# Patient Record
Sex: FEMALE | Race: WHITE | NOT HISPANIC OR LATINO | Employment: FULL TIME | URBAN - METROPOLITAN AREA
[De-identification: names, ages, dates, MRNs, and addresses within clinical notes are randomized per-mention and may not be internally consistent; named-entity substitution may affect disease eponyms.]

---

## 2020-01-15 ENCOUNTER — HOSPITAL ENCOUNTER (INPATIENT)
Facility: HOSPITAL | Age: 60
LOS: 3 days | Discharge: HOME | End: 2020-01-18
Attending: SPECIALIST | Admitting: SPECIALIST

## 2020-01-15 ENCOUNTER — HOSPITAL ENCOUNTER (EMERGENCY)
Facility: HOSPITAL | Age: 60
Discharge: PSYCHIATRIC OR SUBSTANCE USE FACILITY | End: 2020-01-15
Attending: EMERGENCY MEDICINE | Admitting: EMERGENCY MEDICINE

## 2020-01-15 VITALS
BODY MASS INDEX: 22.9 KG/M2 | HEIGHT: 70 IN | OXYGEN SATURATION: 94 % | SYSTOLIC BLOOD PRESSURE: 111 MMHG | HEART RATE: 59 BPM | DIASTOLIC BLOOD PRESSURE: 70 MMHG | RESPIRATION RATE: 16 BRPM | WEIGHT: 160 LBS | TEMPERATURE: 98.4 F

## 2020-01-15 DIAGNOSIS — F33.1 MODERATE EPISODE OF RECURRENT MAJOR DEPRESSIVE DISORDER (HCC): Primary | ICD-10-CM

## 2020-01-15 DIAGNOSIS — R45.851 SUICIDAL IDEATION: ICD-10-CM

## 2020-01-15 DIAGNOSIS — IMO0001 ALCOHOLISM /ALCOHOL ABUSE: ICD-10-CM

## 2020-01-15 DIAGNOSIS — F33.2 SEVERE RECURRENT MAJOR DEPRESSION WITHOUT PSYCHOTIC FEATURES (HCC): Primary | ICD-10-CM

## 2020-01-15 LAB
ALBUMIN SERPL-MCNC: 4.2 G/DL (ref 3.5–5.2)
ALBUMIN/GLOB SERPL: 1.8 G/DL
ALP SERPL-CCNC: 63 U/L (ref 39–117)
ALT SERPL W P-5'-P-CCNC: 13 U/L (ref 1–33)
AMPHET+METHAMPHET UR QL: NEGATIVE
ANION GAP SERPL CALCULATED.3IONS-SCNC: 11.7 MMOL/L (ref 5–15)
AST SERPL-CCNC: 15 U/L (ref 1–32)
BACTERIA UR QL AUTO: NORMAL /HPF
BARBITURATES UR QL SCN: NEGATIVE
BASOPHILS # BLD AUTO: 0.02 10*3/MM3 (ref 0–0.2)
BASOPHILS NFR BLD AUTO: 0.3 % (ref 0–1.5)
BENZODIAZ UR QL SCN: NEGATIVE
BILIRUB SERPL-MCNC: 0.4 MG/DL (ref 0.2–1.2)
BILIRUB UR QL STRIP: NEGATIVE
BUN BLD-MCNC: 7 MG/DL (ref 8–23)
BUN/CREAT SERPL: 10.6 (ref 7–25)
CALCIUM SPEC-SCNC: 9.3 MG/DL (ref 8.6–10.5)
CANNABINOIDS SERPL QL: NEGATIVE
CHLORIDE SERPL-SCNC: 105 MMOL/L (ref 98–107)
CLARITY UR: CLEAR
CO2 SERPL-SCNC: 25.3 MMOL/L (ref 22–29)
COCAINE UR QL: NEGATIVE
COLOR UR: YELLOW
CREAT BLD-MCNC: 0.66 MG/DL (ref 0.57–1)
DEPRECATED RDW RBC AUTO: 40.9 FL (ref 37–54)
EOSINOPHIL # BLD AUTO: 0.04 10*3/MM3 (ref 0–0.4)
EOSINOPHIL NFR BLD AUTO: 0.5 % (ref 0.3–6.2)
ERYTHROCYTE [DISTWIDTH] IN BLOOD BY AUTOMATED COUNT: 11.8 % (ref 12.3–15.4)
ETHANOL BLD-MCNC: <10 MG/DL (ref 0–10)
ETHANOL UR QL: <0.01 %
GFR SERPL CREATININE-BSD FRML MDRD: 91 ML/MIN/1.73
GLOBULIN UR ELPH-MCNC: 2.3 GM/DL
GLUCOSE BLD-MCNC: 110 MG/DL (ref 65–99)
GLUCOSE UR STRIP-MCNC: NEGATIVE MG/DL
HCT VFR BLD AUTO: 38.2 % (ref 34–46.6)
HGB BLD-MCNC: 13.1 G/DL (ref 12–15.9)
HGB UR QL STRIP.AUTO: ABNORMAL
HYALINE CASTS UR QL AUTO: NORMAL /LPF
IMM GRANULOCYTES # BLD AUTO: 0.04 10*3/MM3 (ref 0–0.05)
IMM GRANULOCYTES NFR BLD AUTO: 0.5 % (ref 0–0.5)
KETONES UR QL STRIP: NEGATIVE
LEUKOCYTE ESTERASE UR QL STRIP.AUTO: NEGATIVE
LYMPHOCYTES # BLD AUTO: 1.68 10*3/MM3 (ref 0.7–3.1)
LYMPHOCYTES NFR BLD AUTO: 22 % (ref 19.6–45.3)
MCH RBC QN AUTO: 32 PG (ref 26.6–33)
MCHC RBC AUTO-ENTMCNC: 34.3 G/DL (ref 31.5–35.7)
MCV RBC AUTO: 93.2 FL (ref 79–97)
METHADONE UR QL SCN: NEGATIVE
MONOCYTES # BLD AUTO: 0.49 10*3/MM3 (ref 0.1–0.9)
MONOCYTES NFR BLD AUTO: 6.4 % (ref 5–12)
NEUTROPHILS # BLD AUTO: 5.36 10*3/MM3 (ref 1.7–7)
NEUTROPHILS NFR BLD AUTO: 70.3 % (ref 42.7–76)
NITRITE UR QL STRIP: NEGATIVE
NRBC BLD AUTO-RTO: 0 /100 WBC (ref 0–0.2)
OPIATES UR QL: NEGATIVE
OXYCODONE UR QL SCN: NEGATIVE
PH UR STRIP.AUTO: 6 [PH] (ref 5–8)
PLATELET # BLD AUTO: 233 10*3/MM3 (ref 140–450)
PMV BLD AUTO: 10.5 FL (ref 6–12)
POTASSIUM BLD-SCNC: 4.2 MMOL/L (ref 3.5–5.2)
PROT SERPL-MCNC: 6.5 G/DL (ref 6–8.5)
PROT UR QL STRIP: NEGATIVE
RBC # BLD AUTO: 4.1 10*6/MM3 (ref 3.77–5.28)
RBC # UR: NORMAL /HPF
REF LAB TEST METHOD: NORMAL
SODIUM BLD-SCNC: 142 MMOL/L (ref 136–145)
SP GR UR STRIP: 1.01 (ref 1–1.03)
SQUAMOUS #/AREA URNS HPF: NORMAL /HPF
UROBILINOGEN UR QL STRIP: ABNORMAL
WBC NRBC COR # BLD: 7.63 10*3/MM3 (ref 3.4–10.8)
WBC UR QL AUTO: NORMAL /HPF

## 2020-01-15 PROCEDURE — 80307 DRUG TEST PRSMV CHEM ANLYZR: CPT | Performed by: EMERGENCY MEDICINE

## 2020-01-15 PROCEDURE — 80053 COMPREHEN METABOLIC PANEL: CPT | Performed by: EMERGENCY MEDICINE

## 2020-01-15 PROCEDURE — 85025 COMPLETE CBC W/AUTO DIFF WBC: CPT | Performed by: EMERGENCY MEDICINE

## 2020-01-15 PROCEDURE — 99285 EMERGENCY DEPT VISIT HI MDM: CPT

## 2020-01-15 PROCEDURE — 90791 PSYCH DIAGNOSTIC EVALUATION: CPT

## 2020-01-15 PROCEDURE — 81001 URINALYSIS AUTO W/SCOPE: CPT | Performed by: EMERGENCY MEDICINE

## 2020-01-15 RX ORDER — LOPERAMIDE HYDROCHLORIDE 2 MG/1
2 CAPSULE ORAL
Status: DISCONTINUED | OUTPATIENT
Start: 2020-01-15 | End: 2020-01-18 | Stop reason: HOSPADM

## 2020-01-15 RX ORDER — LISINOPRIL 5 MG/1
5 TABLET ORAL DAILY
COMMUNITY
End: 2020-01-18 | Stop reason: HOSPADM

## 2020-01-15 RX ORDER — LORAZEPAM 1 MG/1
1 TABLET ORAL ONCE
Status: COMPLETED | OUTPATIENT
Start: 2020-01-15 | End: 2020-01-15

## 2020-01-15 RX ORDER — ALUMINA, MAGNESIA, AND SIMETHICONE 2400; 2400; 240 MG/30ML; MG/30ML; MG/30ML
15 SUSPENSION ORAL EVERY 6 HOURS PRN
Status: DISCONTINUED | OUTPATIENT
Start: 2020-01-15 | End: 2020-01-18 | Stop reason: HOSPADM

## 2020-01-15 RX ORDER — TRAZODONE HYDROCHLORIDE 50 MG/1
50 TABLET ORAL NIGHTLY
COMMUNITY

## 2020-01-15 RX ORDER — ACETAMINOPHEN 325 MG/1
650 TABLET ORAL EVERY 4 HOURS PRN
Status: DISCONTINUED | OUTPATIENT
Start: 2020-01-15 | End: 2020-01-18 | Stop reason: HOSPADM

## 2020-01-15 RX ADMIN — LORAZEPAM 1 MG: 1 TABLET ORAL at 20:45

## 2020-01-15 NOTE — ED NOTES
Sitter at bedside and clothes removed from patient. Family at bedside.      Shanon Ramirez, RN  01/15/20 7771

## 2020-01-15 NOTE — ED NOTES
Pt's family approached RN and stated that pt has a boyfriend that might show up to visit her out of concern for her and that the pt does not want to be visited by this person (Mahendra Sales). Pt's family was told that if she is made a private encounter, no visitors will be allowed back after that point, family verbalized understanding and no changes are made to the chart. No safety concerns from the boyfriend at this time.      Shanon Ramirez, RN  01/15/20 1442
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
## 2020-01-15 NOTE — ED TRIAGE NOTES
"Patient c/o depression intermittently for 3 months. She denies SI/HI. She reports having had a seizure Tuesday am and went to [facility] and was admitted overnight. She had a MRI during the admission and her sister reported it showed \"an old stroke\". The patient reports feeling like she is having a nervous breakdown.   "
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
## 2020-01-16 PROBLEM — R77.8 ELEVATED TROPONIN: Status: ACTIVE | Noted: 2020-01-16

## 2020-01-16 PROBLEM — F41.9 ANXIETY DISORDER: Status: ACTIVE | Noted: 2020-01-16

## 2020-01-16 PROBLEM — I10 HTN (HYPERTENSION): Status: ACTIVE | Noted: 2020-01-16

## 2020-01-16 PROBLEM — IMO0001 ALCOHOLISM /ALCOHOL ABUSE: Status: ACTIVE | Noted: 2020-01-16

## 2020-01-16 PROBLEM — R56.9 SEIZURES (HCC): Status: ACTIVE | Noted: 2020-01-16

## 2020-01-16 PROBLEM — R79.89 ELEVATED TROPONIN: Status: ACTIVE | Noted: 2020-01-16

## 2020-01-16 LAB
TROPONIN T SERPL-MCNC: <0.01 NG/ML (ref 0–0.03)
TROPONIN T SERPL-MCNC: <0.01 NG/ML (ref 0–0.03)

## 2020-01-16 PROCEDURE — 93005 ELECTROCARDIOGRAM TRACING: CPT | Performed by: NURSE PRACTITIONER

## 2020-01-16 PROCEDURE — 93010 ELECTROCARDIOGRAM REPORT: CPT | Performed by: INTERNAL MEDICINE

## 2020-01-16 PROCEDURE — 84484 ASSAY OF TROPONIN QUANT: CPT | Performed by: NURSE PRACTITIONER

## 2020-01-16 RX ORDER — TRAZODONE HYDROCHLORIDE 50 MG/1
50 TABLET ORAL NIGHTLY
Status: DISCONTINUED | OUTPATIENT
Start: 2020-01-16 | End: 2020-01-18 | Stop reason: HOSPADM

## 2020-01-16 RX ORDER — LORAZEPAM 1 MG/1
2 TABLET ORAL
Status: DISCONTINUED | OUTPATIENT
Start: 2020-01-16 | End: 2020-01-18 | Stop reason: HOSPADM

## 2020-01-16 RX ORDER — LISINOPRIL 5 MG/1
5 TABLET ORAL DAILY
Status: DISCONTINUED | OUTPATIENT
Start: 2020-01-16 | End: 2020-01-18 | Stop reason: HOSPADM

## 2020-01-16 RX ORDER — DESVENLAFAXINE SUCCINATE 50 MG/1
50 TABLET, EXTENDED RELEASE ORAL DAILY
Status: DISCONTINUED | OUTPATIENT
Start: 2020-01-16 | End: 2020-01-18 | Stop reason: HOSPADM

## 2020-01-16 RX ADMIN — DESVENLAFAXINE SUCCINATE 50 MG: 50 TABLET, EXTENDED RELEASE ORAL at 15:17

## 2020-01-16 RX ADMIN — TRAZODONE HYDROCHLORIDE 50 MG: 50 TABLET ORAL at 20:52

## 2020-01-16 RX ADMIN — LISINOPRIL 5 MG: 5 TABLET ORAL at 15:17

## 2020-01-16 NOTE — PLAN OF CARE
Problem: Patient Care Overview  Goal: Discharge Needs Assessment  Outcome: Ongoing (interventions implemented as appropriate)  Flowsheets  Taken 1/16/2020 1156  Concerns Comments: Pt will return home.  Pt will receive an KALYAN consult  1:1 session.  SW will explore outpt providers for continuity of care.  Taken 1/16/2020 1153  Transportation Anticipated: family or friend will provide  Transportation Concerns: car, none  Concerns to be Addressed: coping/stress;decision making;mental health;discharge planning;relationship;substance/tobacco abuse/use;suicidal  Patient/Family Anticipated Services at Transition: mental health services  Patient/Family Anticipates Transition to: home with family
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
## 2020-01-16 NOTE — PLAN OF CARE
Problem: Patient Care Overview  Goal: Individualization and Mutuality  Outcome: Ongoing (interventions implemented as appropriate)  Flowsheets (Taken 1/16/2020 0958)  Patient Personal Strengths: stable living environment; family/social support     Problem: Patient Care Overview  Goal: Interprofessional Rounds/Family Conf  Outcome: Ongoing (interventions implemented as appropriate)  Flowsheets (Taken 1/16/2020 0958)  Participants: art therapy; ; pastoral care; pharmacy; psychiatrist; nursing; social work  Summary: Treatment team met to discuss pt's plan of care.  Pt will receive an KALYAN consult & 1:1 session.  SW will explore outpt providers.  Progress & svcs needed will be assessed ongoing.     Problem: Mood Impairment (Depressive Signs/Symptoms) (Adult)  Goal: Improved Mood Symptoms (Depressive Signs/Symptoms)  Outcome: Ongoing (interventions implemented as appropriate)  Flowsheets (Taken 1/16/2020 0958)  Improved Mood Symptoms Time Frame for Action Step (STG): 4 days  Improved Mood Symptoms Action Step (STG) Outcome: making progress toward outcome     Problem: Feelings of Worthlessness, Hopelessness, Excessive Guilt (Depressive Signs/Symptoms) (Adult)  Goal: Enhanced Self-Esteem/Confidence (Depressive Signs/Symptoms)  Outcome: Ongoing (interventions implemented as appropriate)  Flowsheets (Taken 1/16/2020 0958)  Enhanced Self-Esteem/Confidence Time Frame for Action Step (STG): 4 days  Enhanced Self-Esteem/Confidence Action Step (STG) Outcome: making progress toward outcome     Problem: Decreased Participation/Engagement (Depressive Signs/Symptoms) (Adult)  Goal: Increased Participation/Engagement (Depressive Signs/Symptoms)  Outcome: Ongoing (interventions implemented as appropriate)  Flowsheets (Taken 1/16/2020 0958)  Increased Participation/Engagement Time Frame for Action Step (STG): 4 days  Increased Participation/Engagement Action Step (STG) Outcome: making progress toward outcome     Problem:  Sleep Impairment (Depressive Signs/Symptoms) (Adult)  Goal: Improved Sleep Hygiene (Depressive Signs/Symptoms)  Outcome: Ongoing (interventions implemented as appropriate)  Flowsheets (Taken 1/16/2020 0958)  Improved Sleep Hygiene Time Frame for Action Step (STG): 4 days  Improved Sleep Hygiene Action Step (STG) Outcome: making progress toward outcome     Problem: Weight/Appetite Change (Depressive Signs/Symptoms) (Adult)  Goal: Nutrition/Weight Optimized (Depressive Signs/Symptoms)  Outcome: Ongoing (interventions implemented as appropriate)  Flowsheets (Taken 1/16/2020 0958)  Optimized Nutrition/Weight Time Frame for Action Step (STG): 4 days  Optimized Nutrition/Weight Action Step (STG) Outcome: making progress toward outcome     Problem: Social/Occupational/Functional Impairment (Depressive Signs/Symptoms) (Adult)  Goal: Improved Social/Occupational/Functional Skills (Depressive Signs/Symptoms)  Outcome: Ongoing (interventions implemented as appropriate)  Flowsheets (Taken 1/16/2020 0958)  Improved Social/Occupational/Functional Skills Time Frame for Action Step (STG): 4 days  Improved Social/Occupational/Functional Skills Action Step (STG) Outcome: making progress toward outcome     Problem: Cognitive Impairment (Depressive Signs/Symptoms) (Adult)  Goal: Improved Ability to Think/Concentrate (Depressive Signs/Symptoms)  Outcome: Ongoing (interventions implemented as appropriate)  Flowsheets (Taken 1/16/2020 0958)  Improved Ability to Think/Concentrate Time Frame for Action Step (STG): 4 days  Improved Ability to Think/Concentrate Action Step (STG) Outcome: making progress toward outcome    Patient/Guardian Signature: __________________________________            Psychiatrist Signature: ______________________________________             Therapist Signature: ________________________________________         Nurse Signature: ___________________________________________          Staff Signature:  ____________________________________________            Staff Signature: ____________________________________________          Staff Signature: ____________________________________________          Staff Signature:
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
## 2020-01-16 NOTE — NURSING NOTE
Pt was brought onto unit accompanied by security and staff. Pt was in wheel chair and d/t being medicated in ER pt required assistance into the bed. Skin assessment was completed by this RN and another. Brusing to Left wrist/hand noted. Unable to complete full assessment d/t pt asleep from anxiety medication given in the ER. Will continue to provide feedback and support.

## 2020-01-16 NOTE — CONSULTS
"Reviewed chart and interviewed pt: Pt disclosed that her drinking has caused her problems. \"I want to stop drinking and be able to love and support my 3 week old grand child instead of worrying about when I am going to be able to have a drink.\" \"I missed out on so much of my child's life because I stayed home and drank and made excuses why I was not there at family events\"   Educated pt on KALYAN/Co-occurring disorders. Educated pt ETOH consumption will cause psychotropic medications to be less effective.   Motivational interviewing to help pt see how much damage in her life ETOH has caused and how much better her life could be. Pt wants to stop drinking. Educated pt in [facility] KALYAN programming and invited her to come to treatment program. Strongly recommend pt have KALYAN treatment if not at [facility] some where. Gave pt all area resources for K[facility]AN IOP and all the recovery fellowships.  Pt interested in coming to [facility] IOP at [facility]. Gave her written information about it as well. Discussed the importance of starting treatment as soon as d/c as risk of relapse stronger the more time some one waits to start.   "

## 2020-01-16 NOTE — CONSULTS
Patient Name:  Maria Del Carmen Ricks  YOB: 1960  MRN:  [id number]  Date of Admission:  1/15/2020  Date of Consult:  1/16/2020  Patient Care Team:  Eisenmenger, James Robert, MD as PCP - General (Family Medicine)    Consults  Date of Admit: 1/15/2020  Date of Consult: 1/16/2020    Medical evaluation contributing to current psychiatric illness.    Subjective     History of Present Illness  Ms. Ricks is a 59 y.o. female with a history of alcohol abuse, anxiety, depression, HTN and seizures admitted to the Crisis Management Unit for further evaluation regarding depression, anxiety and suicidal ideation.  We were asked to see and evaluate her medical issues as they may pertain to her current psychiatric illness. She was recently seen at [facility] for transient alteration of awareness and community acquired pneumonia. Neurology workup negative. When she got home, apparently her mood worsened and she began having suicidal ideations and decided to come to [facility] ED. She currently denies any chest pain, shortness of breath, nausea or vomiting.    Past Medical History:   Diagnosis Date   • Alcohol abuse    • Anxiety    • Depression    • Hypertension    • Seizures (CMS/HCC)      History reviewed. No pertinent surgical history.  History reviewed. No pertinent family history.  Social History     Tobacco Use   • Smoking status: Never Smoker   • Smokeless tobacco: Never Used   Substance Use Topics   • Alcohol use: Yes     Alcohol/week: 3.0 - 4.0 standard drinks     Types: 3 - 4 Cans of beer per week     Comment: Daily with more on weekends   • Drug use: Never     Medications Prior to Admission   Medication Sig Dispense Refill Last Dose   • lisinopril (PRINIVIL,ZESTRIL) 5 MG tablet Take 5 mg by mouth Daily.   1/15/2020 at Unknown time   • Multiple Vitamins-Minerals (MULTIVITAMIN ADULTS PO) Take 1 tablet by mouth Daily.   1/14/2020 at Unknown   • sertraline (ZOLOFT) 50 MG tablet Take 50 mg by mouth Daily.    1/15/2020 at Unknown time   • traZODone (DESYREL) 50 MG tablet Take 50 mg by mouth Every Night.   1/15/2020 at Unknown time     Allergies:  Patient has no known allergies.    Review of Systems   Constitutional: Negative for chills and fever.   HENT: Negative for congestion and dental problem.    Eyes: Negative for discharge and itching.   Respiratory: Negative for cough, chest tightness and shortness of breath.    Cardiovascular: Negative for chest pain, palpitations and leg swelling.   Gastrointestinal: Negative for nausea and vomiting.   Endocrine: Negative for cold intolerance and heat intolerance.   Genitourinary: Negative for difficulty urinating and dysuria.   Musculoskeletal: Negative for back pain and gait problem.   Skin: Negative for color change and pallor.   Allergic/Immunologic: Negative for environmental allergies and food allergies.   Neurological: Negative for dizziness and headaches.   Hematological: Negative for adenopathy. Does not bruise/bleed easily.   Psychiatric/Behavioral: Negative for confusion. The patient is not nervous/anxious.        Objective      Vital Signs  Temp:  [97.6 °F (36.4 °C)-98.4 °F (36.9 °C)] 97.6 °F (36.4 °C)  Heart Rate:  [58-72] 72  Resp:  [14-16] 14  BP: (106-153)/(69-92) 140/79  There is no height or weight on file to calculate BMI.    Physical Exam   Constitutional: She is oriented to person, place, and time. She appears well-developed and well-nourished. No distress.   Eyes: Conjunctivae and EOM are normal.   Neck: Normal range of motion. Neck supple.   Cardiovascular: Normal rate and regular rhythm.   Abdominal: Soft. Bowel sounds are normal. She exhibits no distension. There is no tenderness.   Musculoskeletal: Normal range of motion. She exhibits no edema.   Neurological: She is alert and oriented to person, place, and time.   Skin: Skin is warm and dry.   Psychiatric: She has a normal mood and affect. Her behavior is normal.   Nursing note and vitals  reviewed.      Results Review:    I reviewed the patient's new clinical results.    Assessment/Plan     Active Hospital Problems    Diagnosis  POA   • **Severe recurrent major depression without psychotic features (CMS/HCC) [F33.2]  Yes   • HTN (hypertension) [I10]  Yes   • Seizures (CMS/HCC) [R56.9]  Yes   • Alcoholism /alcohol abuse (CMS/HCC) [F10.20]  Yes   • Anxiety disorder [F41.9]  Yes   • Elevated troponin [R79.89]  Yes      Resolved Hospital Problems   No resolved problems to display.       Assessment & Plan    HTN  - stable, continue home meds    Seizures  - stable, continue home meds    Elevated troponin  - serial troponin  - EKG NSR  - patient denies c/o chest pain, palpitations, SOA. I have instructed the patient should she develop any symptoms to have the nurse contact us immediately.      I will check peripherally for troponin to result. If trending down, will sign off and be available as needed. Thank you for allowing [facility] to be involved in this patient's care.      KENDALL Henderson  [facility]  01/16/20  2:44 PM

## 2020-01-16 NOTE — PROGRESS NOTES
Clinical Pharmacy Services: Medication History    Maria Del Carmen Ricks is a 59 y.o. female presenting to [facility] for Severe recurrent major depression without psychotic features (CMS/HCC) [F33.2]    She  has a past medical history of Alcohol abuse, Anxiety, Depression, Hypertension, and Seizures (CMS/HCC).    Allergies as of 01/15/2020    (No Known Allergies)       Medication information was obtained from: patient, medical record, and pharmacy  Pharmacy and Phone Number: [facility] - [address] - [phone]    Prior to Admission Medications       Prescriptions Last Dose Informant Patient Reported? Taking?    lisinopril (PRINIVIL,ZESTRIL) 5 MG tablet 1/15/2020 Pharmacy Yes Yes    Take 5 mg by mouth Daily.    Multiple Vitamins-Minerals (MULTIVITAMIN ADULTS PO) 1/14/2020 Self Yes Yes    Take 1 tablet by mouth Daily.    sertraline (ZOLOFT) 50 MG tablet 1/15/2020 Pharmacy Yes Yes    Take 50 mg by mouth Daily.    traZODone (DESYREL) 50 MG tablet 1/15/2020 Self Yes Yes    Take 50 mg by mouth Every Night.                Medication notes:  Medications verified with pharmacy and patient:  Trazodone 50mg qhs (patient taking as needed)  Zoloft 50mg daily  Lisinopril 5mg daily    Over-the-counter meds:  Women's One-a-day Multivitamin    Medication history for depressive disorder verified with [place] medical record:  Escitalopram 10mg daily    This medication list is complete to the best of my knowledge as of 1/16/2020    Please call if questions.    Jesse Gomez, PharmD Candidate  1/16/2020 9:10 AM

## 2020-01-16 NOTE — PROGRESS NOTES
Continued Stay Note  [facility]     Patient Name: Maria Del Carmen Ricks  MRN: [id number]  Today's Date: 1/16/2020    Admit Date: 1/15/2020    Discharge Plan     Row Name 01/16/20 1153       Plan    Plan  Pt will return home.  Pt will receive an KALYAN consult  1:1 session.  SW will explore outpt providers for continuity of care.    Plan Comments  SW met w/pt to discuss tx & d/c planning.  Pt was able to verify demographic info as well as PCP, Dr. Eisenmenger.  Pt stated that she receives her psychotropic medications from her PCP.  Pt stated that she does not have any mental health providers at this time and is unsure of she wants to see someone.  When asked about a work note; pt stated that she will need one upon d/c.  SW discussed the meds-to-beds program w/pt.        Discharge Codes    No documentation.             VENKATESH Valadez

## 2020-01-17 RX ADMIN — TRAZODONE HYDROCHLORIDE 50 MG: 50 TABLET ORAL at 20:38

## 2020-01-17 RX ADMIN — LISINOPRIL 5 MG: 5 TABLET ORAL at 08:33

## 2020-01-17 RX ADMIN — DESVENLAFAXINE SUCCINATE 50 MG: 50 TABLET, EXTENDED RELEASE ORAL at 08:33
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
## 2020-01-17 NOTE — H&P
The patient's H&P was dictated by this physician on 1/16/2020 at 0959 hrs.  It was mistakenly identified as a progress note.

## 2020-01-17 NOTE — PROGRESS NOTES
Continued Stay Note  [facility]     Patient Name: Maria Del Carmen Ricks  MRN: [id number]  Today's Date: 1/17/2020    Admit Date: 1/15/2020    Discharge Plan     Row Name 01/17/20 8967       Plan    Plan Comments  SW met w/pt to discuss follow-up care.  Pt was given an [facility] IOP flyer w/date and time to begin.  Pt was adv that [facility] has been set up for her from 1/20 to 1/23.  Pt was also given a survey.    Row Name 01/17/20 0071       Plan    Plan Comments  SW met w/pt to discuss follow-up care as pt is planning to d/c on Saturday.  SW discussed pt attend  [facility] IOP; pt stated that she could not drive due to seizure protocol.  SW adv that if she wanted to attend, transportation could be set up through [facility]; pt agreed to this plan and stated that she would be staying at her sister's home, [address], [address].  SW discussed IOP program and what it entails.  Pt stated that she would need paperwork completed for work; SW adv that the IOP staff could complete it for her.          Discharge Codes    No documentation.             VENKATESH Valadez

## 2020-01-17 NOTE — PROGRESS NOTES
Continued Stay Note  [facility]     Patient Name: Maria Del Carmen Ricks  MRN: [id number]  Today's Date: 1/17/2020    Admit Date: 1/15/2020    Discharge Plan     Row Name 01/17/20 1536       Plan    Plan Comments  SW met w/pt to discuss follow-up care as pt is planning to d/c on Saturday.  SW discussed pt attend  [facility] IOP; pt stated that she could not drive due to seizure protocol.  SW adv that if she wanted to attend, transportation could be set up through [facility]; pt agreed to this plan and stated that she would be staying at her sister's home, [address], [address].  SW discussed IOP program and what it entails.  Pt stated that she would need paperwork completed for work; SW adv that the IOP staff could complete it for her.          Discharge Codes    No documentation.             VENKATESH Valadez

## 2020-01-17 NOTE — PROGRESS NOTES
Chart reviewed, labs stable. Medicine will sign off but will be available should you need us.    Mi Schafer, APRN  1/17/2020 0873

## 2020-01-17 NOTE — PLAN OF CARE
Problem: Patient Care Overview  Goal: Plan of Care Review  Outcome: Ongoing (interventions implemented as appropriate)  Flowsheets (Taken 1/16/2020 1800)  Progress: no change  Plan of Care Reviewed With: patient  Patient Agreement with Plan of Care: agrees  Outcome Summary: Patient attended groups. She was tearful off and on this shift. Patient talked about getting out of a relationship that she is not happy in and how she has seen a decline in herself since being with this person. No SI at this time. Coping skills discussed with patient.  Goal: Individualization and Mutuality  Outcome: Ongoing (interventions implemented as appropriate)  Goal: Discharge Needs Assessment  Outcome: Ongoing (interventions implemented as appropriate)  Goal: Interprofessional Rounds/Family Conf  Outcome: Ongoing (interventions implemented as appropriate)     Problem: Overarching Goals (Adult)  Goal: Adheres to Safety Considerations for Self and Others  Outcome: Ongoing (interventions implemented as appropriate)  Goal: Optimized Coping Skills in Response to Life Stressors  Outcome: Ongoing (interventions implemented as appropriate)  Goal: Develops/Participates in Therapeutic [place] to Support Successful Transition  Outcome: Ongoing (interventions implemented as appropriate)     Problem: Anxiety (Adult)  Goal: Identify Related Risk Factors and Signs and Symptoms  Description  Related risk factors and signs and symptoms are identified upon initiation of Human Response Clinical Practice Guideline (CPG).  Outcome: Ongoing (interventions implemented as appropriate)  Goal: Reduction/Resolution  Description  Patient will demonstrate the desired outcomes by discharge/transition of care.  Outcome: Ongoing (interventions implemented as appropriate)     Problem: Mood Impairment (Depressive Signs/Symptoms) (Adult)  Goal: Improved Mood Symptoms (Depressive Signs/Symptoms)  Outcome: Ongoing (interventions implemented as appropriate)     Problem:  Feelings of Worthlessness, Hopelessness, Excessive Guilt (Depressive Signs/Symptoms) (Adult)  Goal: Enhanced Self-Esteem/Confidence (Depressive Signs/Symptoms)  Outcome: Ongoing (interventions implemented as appropriate)     Problem: Decreased Participation/Engagement (Depressive Signs/Symptoms) (Adult)  Goal: Increased Participation/Engagement (Depressive Signs/Symptoms)  Outcome: Ongoing (interventions implemented as appropriate)     Problem: Sleep Impairment (Depressive Signs/Symptoms) (Adult)  Goal: Improved Sleep Hygiene (Depressive Signs/Symptoms)  Outcome: Ongoing (interventions implemented as appropriate)     Problem: Weight/Appetite Change (Depressive Signs/Symptoms) (Adult)  Goal: Nutrition/Weight Optimized (Depressive Signs/Symptoms)  Outcome: Ongoing (interventions implemented as appropriate)     Problem: Social/Occupational/Functional Impairment (Depressive Signs/Symptoms) (Adult)  Goal: Improved Social/Occupational/Functional Skills (Depressive Signs/Symptoms)  Outcome: Ongoing (interventions implemented as appropriate)     Problem: Cognitive Impairment (Depressive Signs/Symptoms) (Adult)  Goal: Improved Ability to Think/Concentrate (Depressive Signs/Symptoms)  Outcome: Ongoing (interventions implemented as appropriate)     Problem: Suicide Risk (Adult)  Goal: Identify Related Risk Factors and Signs and Symptoms  Description  Related risk factors and signs and symptoms are identified upon initiation of Human Response Clinical Practice Guideline (CPG).  Outcome: Ongoing (interventions implemented as appropriate)  Goal: Strength-Based Wellness/Recovery  Description  Patient will demonstrate the desired outcomes by discharge/transition of care.  Outcome: Ongoing (interventions implemented as appropriate)  Goal: Physical Safety  Description  Patient will demonstrate the desired outcomes by discharge/transition of care.  Outcome: Ongoing (interventions implemented as appropriate)

## 2020-01-17 NOTE — PLAN OF CARE
Problem: Patient Care Overview  Goal: Plan of Care Review  Outcome: Ongoing (interventions implemented as appropriate)  Flowsheets  Taken 1/17/2020 1554  Progress: improving  Outcome Summary: Compliant with medications, She voiced no SI/HI this morning. Pt is pleasant and cooperative with tx. Attending groups. Alert and oriented x3. Will continue to monitor pt. and note any changes.  Taken 1/17/2020 0904  Plan of Care Reviewed With: patient  Patient Agreement with Plan of Care: agrees     Problem: Anxiety (Adult)  Goal: Identify Related Risk Factors and Signs and Symptoms  Outcome: Ongoing (interventions implemented as appropriate)  Flowsheets (Taken 1/17/2020 8221)  Related Risk Factors (Anxiety): coping ineffective; substance use/abuse  Signs and Symptoms (Anxiety): apprehension/being worried; dependence increased     Problem: Anxiety (Adult)  Goal: Reduction/Resolution  Outcome: Ongoing (interventions implemented as appropriate)  Flowsheets (Taken 1/17/2020 8040 by Ana Laura Escobar, RN)  Reduction/Resolution: making progress toward outcome

## 2020-01-17 NOTE — SIGNIFICANT NOTE
Talked with family on the phone to discuss discharge information in preparation for pt to possibly be discharged tomorrow. Talked with pt's sister Mercedes ([phone]) who stated the plan is for pt to live with sister Mercedes and her  once pt is discharged. Mercedes also shared understanding that pt is to follow-up in the [facility] IOP at [facility] starting Monday, January 20, 2020. Mercedes shared concern since pt had been tearful with her on the phone about need to make changes in relationship and also pt shared how had been happy with the treatment at [facility] and feels very safe to be discharged. Mercedes shared plan is to have pt's daughter and mother visit [facility] and discuss who would be able to drive pt to Mercedes's home tomorrow. This conversation was discussed with pt and pt voiced future oriented thinking and denied thoughts of suicide.

## 2020-01-17 NOTE — PLAN OF CARE
Pt out in milieu, engages with peers and staff. Pleasant and cooperative. Compliant with medications. Denied thoughts to hurt self or others. Rated anxiety and depression both 4/10. Will continue to provide feedback and support.    Problem: Patient Care Overview  Goal: Plan of Care Review  Outcome: Ongoing (interventions implemented as appropriate)  Flowsheets (Taken 1/17/2020 0420)  Progress: no change  Plan of Care Reviewed With: patient  Patient Agreement with Plan of Care: agrees  Goal: Individualization and Mutuality  Outcome: Ongoing (interventions implemented as appropriate)  Goal: Discharge Needs Assessment  Outcome: Ongoing (interventions implemented as appropriate)  Goal: Interprofessional Rounds/Family Conf  Outcome: Ongoing (interventions implemented as appropriate)     Problem: Overarching Goals (Adult)  Goal: Adheres to Safety Considerations for Self and Others  Outcome: Ongoing (interventions implemented as appropriate)  Flowsheets (Taken 1/17/2020 0420)  Adheres to Safety Considerations for Self and Others: making progress toward outcome  Goal: Optimized Coping Skills in Response to Life Stressors  Outcome: Ongoing (interventions implemented as appropriate)  Flowsheets (Taken 1/17/2020 0420)  Optimized Coping Skills in Response to Life Stressors: making progress toward outcome  Goal: Develops/Participates in Therapeutic Deering to Support Successful Transition  Outcome: Ongoing (interventions implemented as appropriate)  Flowsheets (Taken 1/17/2020 0420)  Develops/Participates in Therapeutic Deering to Support Successful Transition: making progress toward outcome     Problem: Anxiety (Adult)  Goal: Identify Related Risk Factors and Signs and Symptoms  Outcome: Ongoing (interventions implemented as appropriate)  Flowsheets (Taken 1/17/2020 0420)  Related Risk Factors (Anxiety): substance use/abuse  Signs and Symptoms (Anxiety): apprehension/being worried  Goal: Reduction/Resolution  Outcome: Ongoing  (interventions implemented as appropriate)  Flowsheets (Taken 1/17/2020 0420)  Reduction/Resolution: making progress toward outcome     Problem: Mood Impairment (Depressive Signs/Symptoms) (Adult)  Goal: Improved Mood Symptoms (Depressive Signs/Symptoms)  Outcome: Ongoing (interventions implemented as appropriate)  Flowsheets (Taken 1/17/2020 0420)  Improved Mood Symptoms Action Step (STG) Outcome: making progress toward outcome     Problem: Feelings of Worthlessness, Hopelessness, Excessive Guilt (Depressive Signs/Symptoms) (Adult)  Goal: Enhanced Self-Esteem/Confidence (Depressive Signs/Symptoms)  Outcome: Ongoing (interventions implemented as appropriate)  Flowsheets (Taken 1/17/2020 0420)  Enhanced Self-Esteem/Confidence Action Step (STG) Outcome: making progress toward outcome     Problem: Decreased Participation/Engagement (Depressive Signs/Symptoms) (Adult)  Goal: Increased Participation/Engagement (Depressive Signs/Symptoms)  Outcome: Ongoing (interventions implemented as appropriate)  Flowsheets (Taken 1/17/2020 0420)  Increased Participation/Engagement Action Step (STG) Outcome: making progress toward outcome     Problem: Sleep Impairment (Depressive Signs/Symptoms) (Adult)  Goal: Improved Sleep Hygiene (Depressive Signs/Symptoms)  Outcome: Ongoing (interventions implemented as appropriate)  Flowsheets (Taken 1/17/2020 0420)  Improved Sleep Hygiene Action Step (STG) Outcome: making progress toward outcome     Problem: Weight/Appetite Change (Depressive Signs/Symptoms) (Adult)  Goal: Nutrition/Weight Optimized (Depressive Signs/Symptoms)  Outcome: Ongoing (interventions implemented as appropriate)  Flowsheets (Taken 1/17/2020 0420)  Optimized Nutrition/Weight Action Step (STG) Outcome: making progress toward outcome     Problem: Social/Occupational/Functional Impairment (Depressive Signs/Symptoms) (Adult)  Goal: Improved Social/Occupational/Functional Skills (Depressive Signs/Symptoms)  Outcome: Ongoing  (interventions implemented as appropriate)  Flowsheets (Taken 1/17/2020 0420)  Improved Social/Occupational/Functional Skills Action Step (STG) Outcome: making progress toward outcome     Problem: Cognitive Impairment (Depressive Signs/Symptoms) (Adult)  Goal: Improved Ability to Think/Concentrate (Depressive Signs/Symptoms)  Outcome: Ongoing (interventions implemented as appropriate)  Flowsheets (Taken 1/17/2020 0420)  Improved Ability to Think/Concentrate Action Step (STG) Outcome: making progress toward outcome     Problem: Suicide Risk (Adult)  Goal: Identify Related Risk Factors and Signs and Symptoms  Outcome: Ongoing (interventions implemented as appropriate)  Flowsheets (Taken 1/17/2020 0420)  Related Risk Factors (Suicide Risk): substance use; stressful life event  Signs and Symptoms (Suicide Risk): anxiety  Goal: Strength-Based Wellness/Recovery  Outcome: Ongoing (interventions implemented as appropriate)  Flowsheets (Taken 1/17/2020 0420)  Strength-Based Wellness/Recovery: making progress toward outcome  Goal: Physical Safety  Outcome: Ongoing (interventions implemented as appropriate)  Flowsheets (Taken 1/17/2020 0420)  Physical Safety: making progress toward outcome

## 2020-01-18 VITALS
RESPIRATION RATE: 18 BRPM | OXYGEN SATURATION: 98 % | TEMPERATURE: 97.1 F | SYSTOLIC BLOOD PRESSURE: 120 MMHG | HEART RATE: 73 BPM | DIASTOLIC BLOOD PRESSURE: 81 MMHG

## 2020-01-18 RX ORDER — DESVENLAFAXINE SUCCINATE 50 MG/1
50 TABLET, EXTENDED RELEASE ORAL DAILY
Qty: 30 TABLET | Refills: 0 | Status: SHIPPED | OUTPATIENT
Start: 2020-01-19

## 2020-01-18 RX ADMIN — DESVENLAFAXINE SUCCINATE 50 MG: 50 TABLET, EXTENDED RELEASE ORAL at 08:11

## 2020-01-18 RX ADMIN — LISINOPRIL 5 MG: 5 TABLET ORAL at 08:11

## 2020-01-18 NOTE — PLAN OF CARE
Problem: Patient Care Overview  Goal: Plan of Care Review  Outcome: Ongoing (interventions implemented as appropriate)  Flowsheets (Taken 1/18/2020 5974)  Progress: improving  Plan of Care Reviewed With: patient  Patient Agreement with Plan of Care: agrees  Note:   Pt was pleasant, cooperative, compliant with medications and care. Pt seem brighter, and voiced feeling much better. Pt denied SI, HI, and hallucinations. Pt voiced anxiety 4/10 and depression 8/10. Pt did not score on ciwa during the night. Will continue to monitor behaviors, provide support and safe environment.

## 2020-01-18 NOTE — NURSING NOTE
Discharge instructions given verbalized understanding, all patient's belongings from access center room  given back to her at discharge patient accompanied by daughter at discharge. Instruction on how to report to IOP given she verbalized understanding.

## 2020-01-18 NOTE — PROGRESS NOTES
"Patient is seen, evaluated, and chart reviewed. Discussed with staff.  Patient is seen for Dr. Cano.    Staff reports that patient has been cooperative and compliant with medications.  No behavioral issues.  She has been attending groups.  She has had no symptoms of alcohol withdrawal.  She had a family session yesterday which went well.    On examination, patient is found in group.  Patient slept well last night.  Mood is \"a little anxious.\"  Affect congruent.  No SI/HI/AVH.  Thought processes are goal-directed.  Judgment and insight are okay.    No medication side effects.  Patient is provided with supportive therapy.  Patient will discharge home today where she will be staying with her sister.  She is to follow-up in the [facility] starting Monday.    "

## 2020-01-20 ENCOUNTER — OFFICE VISIT (OUTPATIENT)
Dept: PSYCHIATRY | Facility: HOSPITAL | Age: 60
End: 2020-01-20

## 2020-01-20 DIAGNOSIS — IMO0001 ALCOHOLISM /ALCOHOL ABUSE: ICD-10-CM

## 2020-01-20 DIAGNOSIS — F33.2 SEVERE RECURRENT MAJOR DEPRESSION WITHOUT PSYCHOTIC FEATURES (HCC): Primary | ICD-10-CM

## 2020-01-20 NOTE — PROGRESS NOTES
Teaching Record:  Information / activity:  Spirituality    Good participation   [id number]      Sherry Silva, LCSW

## 2020-01-21 ENCOUNTER — OFFICE VISIT (OUTPATIENT)
Dept: PSYCHIATRY | Facility: HOSPITAL | Age: 60
End: 2020-01-21

## 2020-01-21 DIAGNOSIS — F33.2 SEVERE RECURRENT MAJOR DEPRESSION WITHOUT PSYCHOTIC FEATURES (HCC): Primary | ICD-10-CM

## 2020-01-21 DIAGNOSIS — IMO0001 ALCOHOLISM /ALCOHOL ABUSE: ICD-10-CM

## 2020-01-21 NOTE — PROGRESS NOTES
"CD IOP Group note  Observations:  8262-7061  Engaged in Activity / Process and Self -disclosed: Yes  Applies Topic to self: Yes  Able to give Constructive Feedback: Yes  Affect: anxious  Degree of Insightful Thinking 5  Notes:  Pt harbors doubts that she is an alcoholic. \"I drank for so long and every day with my boyfriend that I convinced myself that he had the problem and I was just there for the ride\" \"I knew that I stopped doing anything with my family and drank with him\" \"I am determined to be there for my 2 week old grand daughter.\" Pt has \"no\" SI.      Sherry Silva, LCSW            "

## 2020-01-21 NOTE — PROGRESS NOTES
Teaching Record:  Information / activity:  Expressive Therapy  2276-7568 Art Therapy - therapeutic story [employer] Morning Glory and [employer] Storm, creating image of part of the story and then processing    Good participation      Dawson Ferguson, LPAT

## 2020-01-21 NOTE — PROGRESS NOTES
Continued Stay Note  [facility]     Patient Name: Maria Del Carmen Ricks  MRN: [id number]  Today's Date: 1/21/2020    Admit Date: 1/15/2020    Discharge Plan     Row Name 01/21/20 0847       Plan    Final Discharge Disposition Code  01 - home or self-care    Final Note  Pt was discharged per Dr. Cano's orders on 1/18/2020.  Pt has agreed to attend [facility] on 1/20/2020.  Pt was transported home by a family member.        Discharge Codes    No documentation.       Expected Discharge Date and Time     Expected Discharge Date Expected Discharge Time    Jan 18, 2020             VENKATESH Valadez

## 2020-01-21 NOTE — PROGRESS NOTES
"CD IOP Group note  Observations:  8744-4636  Engaged in Activity / Process and Self -disclosed: Yes  Applies Topic to self: Yes  Able to give Constructive Feedback: Yes  Affect: anxious  Degree of Insightful Thinking 5  Notes:  Pt processed worry that she has cravings for ETOH. Pt processed anxiety concerning leaving the relationship with her boyfriend. \"He does not know yet and I do not know how to tell him\" Pt shared a text from him which said he loved her and when was she coming home? Offered to meet with pt and her boyfriend and family if pt would like to tell him in session. Pt declined. \"My sister and daughter will be with me when I tell him and it will not be in person\" Pt has \"no\" JOE.      Sherry Silva LCSW            "

## 2020-01-22 ENCOUNTER — OFFICE VISIT (OUTPATIENT)
Dept: PSYCHIATRY | Facility: HOSPITAL | Age: 60
End: 2020-01-22

## 2020-01-22 DIAGNOSIS — IMO0001 ALCOHOLISM /ALCOHOL ABUSE: ICD-10-CM

## 2020-01-22 DIAGNOSIS — F33.2 SEVERE RECURRENT MAJOR DEPRESSION WITHOUT PSYCHOTIC FEATURES (HCC): Primary | ICD-10-CM

## 2020-01-22 NOTE — PROGRESS NOTES
Teaching Record:  Information / activity:  Stages of Family Recovery - Family Program    Good participation   7069-1909      Sherry Silva, LCSW

## 2020-01-22 NOTE — PROGRESS NOTES
"CD IOP Group note  Observations:    Engaged in Activity / Process and Self -disclosed: Yes  Applies Topic to self: Yes  Able to give Constructive Feedback: Yes  Affect: anxious  Degree of Insightful Thinking 6  Notes:  1793-5979  Pt processed anxiety concerning her aunt who is dying from cancer out of state. \"She is my favorite aunt and she is dying.  Pt will go with her family to say good bye this weekend so she will be absent Thursday. Pt has \"no\" SI.      Sherry Silva LCSW            "

## 2020-01-22 NOTE — PROGRESS NOTES
"CD IOP Group note  Observations:    Engaged in Activity / Process and Self -disclosed: Yes  Applies Topic to self: Yes  Able to give Constructive Feedback: Yes  Affect: anxious  Degree of Insightful Thinking 6  Notes:  0996-8415  Prt processed anxiety concerning her cravings for ETOH. Referred to Vivitrol consultation. Pt has \"no\" JOE Silva, STEFFANIE            "

## 2020-01-23 ENCOUNTER — OFFICE VISIT (OUTPATIENT)
Dept: PSYCHIATRY | Facility: HOSPITAL | Age: 60
End: 2020-01-23

## 2020-01-23 DIAGNOSIS — F33.2 SEVERE RECURRENT MAJOR DEPRESSION WITHOUT PSYCHOTIC FEATURES (HCC): ICD-10-CM

## 2020-01-23 DIAGNOSIS — IMO0001 ALCOHOLISM /ALCOHOL ABUSE: Primary | ICD-10-CM

## 2020-01-23 NOTE — PROGRESS NOTES
"CD IOP Group note  Observations:    Engaged in Activity / Process and Self -disclosed: Yes  Applies Topic to self: Yes  Able to give Constructive Feedback: Yes  Affect: anxious  Degree of Insightful Thinking 6  Notes:  6884-5676  Pt tearfully processed that she has decided to tell her boyfriend of 13 years she is leaving the relationship. \"He drinks every day and night and I just can not watch him die\" \"My drinking isolated me and I want to be a good Kellie and be there for my family too. \"I missed too many family events because I was drunk\". Pt has \"no\" SI      Shrery Silva, LCSW            "

## 2020-01-23 NOTE — PROGRESS NOTES
Teaching Record:  Information / activity:  Process Addictions, Heroin and Opiates Education and Marijuana Education    Good participation  4980-9788      Sherry Silva LCSW

## 2020-01-23 NOTE — PROGRESS NOTES
"CD IOP Group note  Observations:    Engaged in Activity / Process and Self -disclosed: Yes  Applies Topic to self: Yes  Able to give Constructive Feedback: Yes  Affect: anxious  Degree of Insightful Thinking 5  Notes:  8579-8009  Assisted pt in formulation of nd processing her weekend sobriety plan. Pt has \"no\" JOE.      Sherry Silva, STEFFANIE            "

## 2020-01-27 ENCOUNTER — OFFICE VISIT (OUTPATIENT)
Dept: PSYCHIATRY | Facility: HOSPITAL | Age: 60
End: 2020-01-27

## 2020-01-27 DIAGNOSIS — IMO0001 ALCOHOLISM /ALCOHOL ABUSE: Primary | ICD-10-CM

## 2020-01-27 DIAGNOSIS — F33.2 SEVERE RECURRENT MAJOR DEPRESSION WITHOUT PSYCHOTIC FEATURES (HCC): ICD-10-CM

## 2020-01-27 NOTE — PROGRESS NOTES
Teaching Record:  Information / activity:  Spirituality    Good participation   [id number]      Sherry Silva, LCSW

## 2020-01-28 ENCOUNTER — OFFICE VISIT (OUTPATIENT)
Dept: PSYCHIATRY | Facility: HOSPITAL | Age: 60
End: 2020-01-28

## 2020-01-28 DIAGNOSIS — F33.2 SEVERE RECURRENT MAJOR DEPRESSION WITHOUT PSYCHOTIC FEATURES (HCC): ICD-10-CM

## 2020-01-28 DIAGNOSIS — IMO0001 ALCOHOLISM /ALCOHOL ABUSE: Primary | ICD-10-CM

## 2020-01-28 NOTE — PROGRESS NOTES
"CD IOP Group note  Observations:    Engaged in Activity / Process and Self -disclosed: Yes  Applies Topic to self: Yes  Able to give Constructive Feedback: Yes  Affect: tearful  Degree of Insightful Thinking 5  Notes:  7581-5222  Pt processed high triggers to use ETOH. I told my boyfriend that we could not longer live together and that I would not be moving back to the house we lived in together for 13 years. \"I put a lot of money into that home and I just want my investment back\" Pt has \"no\" SI.      Sherry Silva, LCSW            "

## 2020-01-28 NOTE — PROGRESS NOTES
"CD IOP Group note  Observations:    Engaged in Activity / Process and Self -disclosed: Yes  Applies Topic to self: Yes  Able to give Constructive Feedback: Yes  Affect: anxious  Degree of Insightful Thinking 6  Notes:  0659-3959  Pt identifiedand processed triggers for ETOH. Assisted pt in identifying coping responses for each identified trigger. Pt supported and encouraged. Pt has \"no\" SI.      Sherry Silva, STEFFANIE            "

## 2020-01-28 NOTE — PROGRESS NOTES
Teaching Record:  Information / activity:  Expressive Therapy  8066-6430 Art Therapy - Creation of image of creature as metaphor for addiction and then creating a dialogue between self and creature    Good participation      Dawson Ferguson, LPAT

## 2020-01-29 ENCOUNTER — OFFICE VISIT (OUTPATIENT)
Dept: PSYCHIATRY | Facility: HOSPITAL | Age: 60
End: 2020-01-29

## 2020-01-29 DIAGNOSIS — IMO0001 ALCOHOLISM /ALCOHOL ABUSE: ICD-10-CM

## 2020-01-29 DIAGNOSIS — F33.2 SEVERE RECURRENT MAJOR DEPRESSION WITHOUT PSYCHOTIC FEATURES (HCC): Primary | ICD-10-CM

## 2020-01-29 NOTE — PROGRESS NOTES
Teaching Record:  Information / activity:  Families in Recovery - Family Program    Good participation  0769-5062      Sherry Silva, MEENAKSHIW

## 2020-01-30 ENCOUNTER — OFFICE VISIT (OUTPATIENT)
Dept: PSYCHIATRY | Facility: HOSPITAL | Age: 60
End: 2020-01-30

## 2020-01-30 DIAGNOSIS — IMO0001 ALCOHOLISM /ALCOHOL ABUSE: ICD-10-CM

## 2020-01-30 DIAGNOSIS — F33.2 SEVERE RECURRENT MAJOR DEPRESSION WITHOUT PSYCHOTIC FEATURES (HCC): Primary | ICD-10-CM

## 2020-01-31 NOTE — PROGRESS NOTES
CD IOP Group note  Observations:    Engaged in Activity / Process and Self -disclosed: Yes  Applies Topic to self: Yes  Able to give Constructive Feedback: Yes  Affect: anxious  Degree of Insightful Thinking 5  Notes:  7004-2709  Pt tearfully processed her anxiety concerning moving out of her 13 year boyfriend's home. Pt processed grief and loss of relationship. She is moving out because he will not stop or slow down his ETOH abuse.       MEENAKSHI WhaleyW

## 2020-01-31 NOTE — PROGRESS NOTES
"CD IOP Group note  Observations:    Engaged in Activity / Process and Self -disclosed: Yes  Applies Topic to self: Yes  Able to give Constructive Feedback: Yes  Affect: anxious  Degree of Insightful Thinking 6  Notes:  7422-2223  Pt processed being frustrated because her daughter is smothering me I am better but she treating me like a child. Encouraged pt to have patients and tolerance as her daughter almost lost her mother and is fearful Ptr has \"no\" JOE Silva, STEFFANIE            "

## 2020-01-31 NOTE — PROGRESS NOTES
"CD IOP Group note  Observations:    Engaged in Activity / Process and Self -disclosed: Yes  Applies Topic to self: Yes  Able to give Constructive Feedback: Yes  Affect: anxious  Degree of Insightful Thinking 5  Notes:  5585-2231  Pt processed high cravings for ETOH. Assisted pt in formulation of her afternoon sober plan. Pt has \"no\" ROSELYN Silva, [facility]            "

## 2020-01-31 NOTE — PROGRESS NOTES
"CD IOP Group note  Observations:    Engaged in Activity / Process and Self -disclosed: Yes  Applies Topic to self: Yes  Able to give Constructive Feedback: Yes  Affect: anxious  Degree of Insightful Thinking 6  Notes:  2635-4668  Pt processed anxiety and guilt and shame for her dishonesty during his active addiction. Pt has \"no\" SI.      Sherry Silva, [facility]            "

## 2020-01-31 NOTE — PROGRESS NOTES
"CD IOP Group note  Observations:    Engaged in Activity / Process and Self -disclosed: Yes  Applies Topic to self: Yes  Able to give Constructive Feedback: Yes  Affect: sad and anxious  Degree of Insightful Thinking 6  Notes:  0293-5787  Assisted pt in formulation of and processing his weekend sobriety plan. Pt has \"no\" SI      Sherry Silva, MEENAKSHIW            "

## 2020-01-31 NOTE — PROGRESS NOTES
Teaching Record:  Information / activity:  Alcohol Education and Stress Management in Recovery    Good participation  4047-2115      MEENAKSHI WhaleyW

## 2020-01-31 NOTE — PROGRESS NOTES
"CD IOP Group note  Observations:    Engaged in Activity / Process and Self -disclosed: Yes  Applies Topic to self: Yes  Able to give Constructive Feedback: Yes  Affect: anxious  Degree of Insightful Thinking 6  Notes:  9728-2497  Pt processed high anxiety and cravings for ETOH Discussed recommendation of Vivitrol. Pt has \"no\" SI.      Sherry Silva, LCSW            "

## 2020-02-03 ENCOUNTER — OFFICE VISIT (OUTPATIENT)
Dept: PSYCHIATRY | Facility: HOSPITAL | Age: 60
End: 2020-02-03

## 2020-02-03 DIAGNOSIS — F33.2 SEVERE RECURRENT MAJOR DEPRESSION WITHOUT PSYCHOTIC FEATURES (HCC): ICD-10-CM

## 2020-02-03 DIAGNOSIS — IMO0001 ALCOHOLISM /ALCOHOL ABUSE: Primary | ICD-10-CM

## 2020-02-03 NOTE — PROGRESS NOTES
Mood at 1 with 10 being the worse and found listening to others in group the most helpful.    An increase/decrease in normal appetite (increase)    No SI.  Appropriate goals for the evening.  Patient plans to work out and take a walk today.

## 2020-02-03 NOTE — PROGRESS NOTES
"Group note 9:00am-10:00am:  Reading today was on sponsorship.  Patient has not gone to any 12 step meetings so was not familiar with the process.  Strongly encouraged her to go.  She reports no cravings for alcohol.  She is struggling with anxiety as she is breaking up with her boyfriend/\"drinking andie\".  She is still in the process of moving out.  "

## 2020-02-03 NOTE — PROGRESS NOTES
Group note 10:30am-11:45am:  Patient states she is feeling better due to the weather, her medications and getting out of the house.  Group talked about distorted thinking and how one can convince themselves that they are doing OK.  Talked about the importance of identifying triggers and building a support system when not in a crisis.

## 2020-02-04 ENCOUNTER — OFFICE VISIT (OUTPATIENT)
Dept: PSYCHIATRY | Facility: HOSPITAL | Age: 60
End: 2020-02-04

## 2020-02-04 DIAGNOSIS — F33.2 SEVERE RECURRENT MAJOR DEPRESSION WITHOUT PSYCHOTIC FEATURES (HCC): ICD-10-CM

## 2020-02-04 DIAGNOSIS — IMO0001 ALCOHOLISM /ALCOHOL ABUSE: Primary | ICD-10-CM

## 2020-02-04 NOTE — PROGRESS NOTES
Teaching Record:  Information / activity:  Expressive Therapy  8666-8475 Art Therapy - Box Self collage task using magazine pictures to describe inner and outer self    Good participation      Dawson Ferguson, LPAT

## 2020-02-04 NOTE — PROGRESS NOTES
Mood at 1 with 10 being the worse and finds talking in group helpful.     No symptoms reported.     No SI.  Patient has been going to the gym after program.

## 2020-02-04 NOTE — PROGRESS NOTES
Group note 9:00am-10:00am:  Patient states that she is not experiencing any triggers or cravings but she is breaking up with a long time boyfriend, moving,etc.  She is not going to meetings and does not have sober support outside of [facility].  She talked about not wanting to be alone, being afraid to be by herself.  Will continue to encourage meetings.  She is feeling so much better that she is not seeing the need.

## 2020-02-06 ENCOUNTER — OFFICE VISIT (OUTPATIENT)
Dept: PSYCHIATRY | Facility: HOSPITAL | Age: 60
End: 2020-02-06

## 2020-02-06 DIAGNOSIS — IMO0001 ALCOHOLISM /ALCOHOL ABUSE: Primary | ICD-10-CM

## 2020-02-06 DIAGNOSIS — F33.2 SEVERE RECURRENT MAJOR DEPRESSION WITHOUT PSYCHOTIC FEATURES (HCC): ICD-10-CM

## 2020-02-06 NOTE — PROGRESS NOTES
Late entry Treatment team met on 2/4/2020:  Patient started IOP on 1/20/20 and as today has attended 11 sessions (18 authorized)  And has 23 days sober.  She has not been to any meetings but has committed to go with other women in the group.  Patient is going to see her aunt this weekend in [place] who is dying and is anxious about this trip.  Is also working on getting her belongings out of the ex's home.  She is attentive and engaged in treatment and is supportive of others.

## 2020-02-06 NOTE — PROGRESS NOTES
Mood at 1 with 10 being the worse and found group to be the most helpful.    No symptoms reported     No SI.    Patient is anxious about her trip this weekend to see her aunt who is dying.  She is still working on getting her belongings out of the ex's home.  Committed to going to a meeting with other women in the group.

## 2020-02-06 NOTE — PROGRESS NOTES
[id number]       Open Group Therapy:  Pt arrive to group on time. Pt shared in the discussions and shared how helpful the program in understanding how ETOH effects the body including the brain. Pt discussed need to visit ill aunt in another state this weekend and plan to return to [facility] on Monday. Pt also shared of desire to attend an AA meeting on Tuesday next week and made plans with peer to go. Pt shared support for peers.

## 2020-02-10 ENCOUNTER — OFFICE VISIT (OUTPATIENT)
Dept: PSYCHIATRY | Facility: HOSPITAL | Age: 60
End: 2020-02-10

## 2020-02-10 DIAGNOSIS — F33.2 SEVERE RECURRENT MAJOR DEPRESSION WITHOUT PSYCHOTIC FEATURES (HCC): ICD-10-CM

## 2020-02-10 DIAGNOSIS — IMO0001 ALCOHOLISM /ALCOHOL ABUSE: Primary | ICD-10-CM

## 2020-02-10 NOTE — PROGRESS NOTES
Teaching Record:  Information / activity:  Overview of the 12 Steps and Process Addictions    Good participation [phone]      Sherry Silva, LCSW

## 2020-02-10 NOTE — PROGRESS NOTES
"CD IOP Group note  Observations:    Engaged in Activity / Process and Self -disclosed: Yes  Applies Topic to self: Yes  Able to give Constructive Feedback: Yes  Affect: anxious  Degree of Insightful Thinking 5  Notes:  3290-5157  Pt processed anxiety and sadness that her Aunt is dying. Pt went to [place] this past weekend to see her Aunt. \"She did not look as bad as I had expected and she knew me but I am so sad that she has not got much time to live\" \"Hosperice is taking care of her and she wants to die at home\" Pt tearful. Pt has \"no\" ROSELYN Silva, LCSW            "

## 2020-02-10 NOTE — PROGRESS NOTES
"CD IOP Group note  Observations:    Engaged in Activity / Process and Self -disclosed: Yes  Applies Topic to self: Yes  Able to give Constructive Feedback: Yes  Affect: anxious  Degree of Insightful Thinking 6  Notes:  0783-1566  Pt processed anxiety and grief and loss issues stopping ETOH and break up with her 13 year relationship. Pt has \"no\" SI. Encouraged pt to find a AA home group and attend weekly. Pt has \"no\" SI.      Sherry Silva LCSW            "

## 2020-02-11 ENCOUNTER — OFFICE VISIT (OUTPATIENT)
Dept: PSYCHIATRY | Facility: HOSPITAL | Age: 60
End: 2020-02-11

## 2020-02-11 DIAGNOSIS — IMO0001 ALCOHOLISM /ALCOHOL ABUSE: Primary | ICD-10-CM

## 2020-02-11 DIAGNOSIS — F33.2 SEVERE RECURRENT MAJOR DEPRESSION WITHOUT PSYCHOTIC FEATURES (HCC): ICD-10-CM

## 2020-02-11 NOTE — PROGRESS NOTES
"Plan of care review:  Pt has attended 13 [facility] IOP sessions. Pt has moved out of the house she shared with paramour for 13 years. Pt has disconnected all utilities she has been paying for there. Pt living with her sister currently. Pt has acquired a new job that she will begin upon d/C from [facility]. She changed jobs because her old job is next door to the house of X-boyfriend. Pt reports her sobriety date to be 1-15-20. Pt processing cravings for ET OH and referred for Vivitrol consult. Pt reports \"none\" SI. Pt processing loss and grief of her ET OH and relationship. Pt engaged in group process and very insightful to apply what she is learning in IOP to her own life situations. Pt has \"no\" SI.  "

## 2020-02-11 NOTE — PROGRESS NOTES
Teaching Record:  Information / activity:  Expressive Therapy  0159-6978 Art Therapy  - Bridge Drawing showing self in transition from middle of addiction to future recovery.    Good participation      Dawson Ferguson, LPAT

## 2020-02-12 ENCOUNTER — OFFICE VISIT (OUTPATIENT)
Dept: PSYCHIATRY | Facility: HOSPITAL | Age: 60
End: 2020-02-12

## 2020-02-12 DIAGNOSIS — IMO0001 ALCOHOLISM /ALCOHOL ABUSE: Primary | ICD-10-CM

## 2020-02-12 DIAGNOSIS — F33.2 SEVERE RECURRENT MAJOR DEPRESSION WITHOUT PSYCHOTIC FEATURES (HCC): ICD-10-CM

## 2020-02-12 NOTE — PROGRESS NOTES
Teaching Record:  Information / activity:  Roadmap for Recovery - Family Program    Good participation   7894-2227      Sherry Silva, MEENAKSHIW

## 2020-02-13 ENCOUNTER — OFFICE VISIT (OUTPATIENT)
Dept: PSYCHIATRY | Facility: HOSPITAL | Age: 60
End: 2020-02-13

## 2020-02-13 DIAGNOSIS — IMO0001 ALCOHOLISM /ALCOHOL ABUSE: Primary | ICD-10-CM

## 2020-02-13 DIAGNOSIS — F33.2 SEVERE RECURRENT MAJOR DEPRESSION WITHOUT PSYCHOTIC FEATURES (HCC): ICD-10-CM

## 2020-02-13 NOTE — PROGRESS NOTES
"CD IOP Group note  Observations:    Engaged in Activity / Process and Self -disclosed: Yes  Applies Topic to self: Yes  Able to give Constructive Feedback: Yes  Affect: anxious  Degree of Insightful Thinking 5  Notes:  8530-0193  Pt processed anxiety concerning her aunt's impending death.  Assisted pt in formulation of and processing her weekend sobriety plan. Pt has \"no\" SI.      Sherry Silva, STEFFANIE            "

## 2020-02-13 NOTE — PROGRESS NOTES
"CD IOP Group note  Observations:    Engaged in Activity / Process and Self -disclosed: Yes  Applies Topic to self: Yes  Able to give Constructive Feedback: Yes  Affect: sad, tearful and anxious  Degree of Insightful Thinking 6  Notes:  9334-1929  Pt processed sadness and feelings of guilt that in her active addiction \" I did not spend time with my family especially my mother.\" \"Now she is sick and was in the hospital I feel so bad that I have neglected her for drinking\" Pt has strong cravings for drink but has decided not to have Vivitrol injection. Encouraged pt to reconsider. Pt has \"no\" ROSELYN Silva, LCSW            "

## 2020-02-13 NOTE — PROGRESS NOTES
"CD IOP Group note  Observations:    Engaged in Activity / Process and Self -disclosed: Yes  Applies Topic to self: Yes  Able to give Constructive Feedback: Yes  Affect: anxious  Degree of Insightful Thinking 6  Notes:  8718-7130  Pt processed anxiety about identifying hersef as an alcoholic in AA meetings. Summarized all the negative consequences pt identified as a result of her drinking. Processed the progression of her KALYAN and how isolation and depression were pt constant companions during her active addiction. Subsequently, Pt said in gtoup \"Hi my name is Maria Del Carmen and I am an alcoholic\" Pt has \"no\" SI.      Sherry Silva, MEENAKSHIW            "

## 2020-02-13 NOTE — PROGRESS NOTES
Teaching Record:  Information / activity:  Process Addictions    Good participation  [id number]      MEENAKSHI WhaleyW

## 2020-02-13 NOTE — PROGRESS NOTES
"CD IOP Group note  Observations:    Engaged in Activity / Process and Self -disclosed: Yes  Applies Topic to self: Yes  Able to give Constructive Feedback: Yes  Affect: anxious  Degree of Insightful Thinking 6  Notes:  8009-9052  Pt processed having feelings of dread concerning her X-boyfriend and tomorrow being Valentines Day. \"I am glad he does not know where I am living so he may not be able to contact me directly but I am dreading texts\" \"He has texted some about how much he misses me and wants me to come back but is not willing to stop drinking. Pt has \"no\" SI.      Sherry Silva, LCSW            "

## 2020-02-17 ENCOUNTER — OFFICE VISIT (OUTPATIENT)
Dept: PSYCHIATRY | Facility: HOSPITAL | Age: 60
End: 2020-02-17

## 2020-02-17 DIAGNOSIS — IMO0001 ALCOHOLISM /ALCOHOL ABUSE: Primary | ICD-10-CM

## 2020-02-17 DIAGNOSIS — F33.2 SEVERE RECURRENT MAJOR DEPRESSION WITHOUT PSYCHOTIC FEATURES (HCC): ICD-10-CM

## 2020-02-17 NOTE — PROGRESS NOTES
"CD IOP Group note  Observations:    Engaged in Activity / Process and Self -disclosed: Yes  Applies Topic to self: Yes  Able to give Constructive Feedback: Yes  Affect: anxious  Degree of Insightful Thinking 6  Notes:  [id number]  Pt processed grief and loss of 13 year relationship. Pt has \"no\" ROSELYN Silva LCSW            "

## 2020-02-17 NOTE — PROGRESS NOTES
"CD IOP Group note  Observations:    Engaged in Activity / Process and Self -disclosed: Yes  Applies Topic to self: Yes  Able to give Constructive Feedback: Yes  Affect: sad and anxious  Degree of Insightful Thinking 6  Notes:  6611-8356  Pt processed anxiety and sadness along with grief and loss concerning her now X-boyfriend of 13 years. Pt has \"no\" SI.      Sherry Silva, [facility]            "

## 2020-02-17 NOTE — PROGRESS NOTES
"CD IOP Group note  Observations:    Engaged in Activity / Process and Self -disclosed: Yes  Applies Topic to self: Yes  Able to give Constructive Feedback: Yes  Affect: anxious  Degree of Insightful Thinking 6  Notes:  5088-6964  Assisted pt in fromulation and processing her afternood sobriety plan. Pt has \"no\" SI.      Sherry Silva, STEFFANIE            "

## 2020-02-17 NOTE — PROGRESS NOTES
Teaching Record:  Information / activity:  Overview of the 12 Steps and Individual / Conjoint Skills Workbook    Good participation   [id number]      Sherry Silva, MEENAKSHIW

## 2020-02-17 NOTE — PROGRESS NOTES
"CD IOP Group note  Observations:    Engaged in Activity / Process and Self -disclosed: Yes  Applies Topic to self: Yes  Able to give Constructive Feedback: Yes  Affect: anxious  Degree of Insightful Thinking 6  Notes:  0570-8308  Pt stated she was in high risk situation over the weekend. 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 I went to the house to get some of my belongings and he was there asking me not to leave and to be in contact.\" \"I told him he needed to get help and I can not help him. Pt reports having cravings for ETOH while there. \"I checked in the frig to see of someone had drank my beer that was there\" Pt has \"no\" SI.    Sherry Silva, LCSW            "

## 2020-02-18 ENCOUNTER — OFFICE VISIT (OUTPATIENT)
Dept: PSYCHIATRY | Facility: HOSPITAL | Age: 60
End: 2020-02-18

## 2020-02-18 DIAGNOSIS — F33.2 SEVERE RECURRENT MAJOR DEPRESSION WITHOUT PSYCHOTIC FEATURES (HCC): ICD-10-CM

## 2020-02-18 DIAGNOSIS — IMO0001 ALCOHOLISM /ALCOHOL ABUSE: Primary | ICD-10-CM

## 2020-02-18 NOTE — PROGRESS NOTES
Teaching Record:  Information / activity:  Expressive Therapy  6808-1178 Art Therapy - markers on paper - Story-board creation showing self and metaphor for addiction interacting    Good participation      Dawson Ferguson, LPAT

## 2020-02-18 NOTE — PROGRESS NOTES
"CD IOP Group note  Observations:    Engaged in Activity / Process and Self -disclosed: Yes  Applies Topic to self: Yes  Able to give Constructive Feedback: Yes  Affect: anxious  Degree of Insightful Thinking 5  Notes:  2045-9812  Pt processed anxiety and grief. \"I did not know how much it would bother me to see my X on Valentines Day. Pt processed increase in cravings since she saw him. Pt has \"no\" SI. Referred pt for Vivitrol consult.       MEENAKSHI WhaleyW            "

## 2020-02-18 NOTE — PROGRESS NOTES
"Treatment Team met to review Plan of care.  Pt has attended 17 K[facility]AN IOP sessions. She has a AA home group and actively seeking sponsor. Pt reports 32 days abstinent. She reports her sobriety date to be 1-15-20. Pt processing grief and loss of 13 year relationship with Paramedin who was an alcoholic. \"He is not interested in stopping drinking but I will get well\" Pt has \"no\" SI.  "

## 2020-02-18 NOTE — PROGRESS NOTES
"CD IOP Group note  Observations:    Engaged in Activity / Process and Self -disclosed: Yes  Applies Topic to self: Yes  Able to give Constructive Feedback: Yes  Affect: anxious  Degree of Insightful Thinking 6  Notes:  3702-6014  Pt processed anxiety and sadness that \"I hurt my mother during my active addiction because I stayed home and drank instead of going to see her or attend family functions where she was there\" \"Now she is sick and I do not know how ling she will be around. Pt has \"no\" ROSELYN Silva, [facility]              "

## 2020-02-19 ENCOUNTER — OFFICE VISIT (OUTPATIENT)
Dept: PSYCHIATRY | Facility: HOSPITAL | Age: 60
End: 2020-02-19

## 2020-02-19 DIAGNOSIS — IMO0001 ALCOHOLISM /ALCOHOL ABUSE: Primary | ICD-10-CM

## 2020-02-19 DIAGNOSIS — F33.2 SEVERE RECURRENT MAJOR DEPRESSION WITHOUT PSYCHOTIC FEATURES (HCC): ICD-10-CM

## 2020-02-19 NOTE — PROGRESS NOTES
"CD IOP Group note  Observations:    Engaged in Activity / Process and Self -disclosed: Yes  Applies Topic to self: Yes  Able to give Constructive Feedback: Yes  Affect: anxious  Degree of Insightful Thinking 6  Notes:  1725-8837  Pt processed anxiety and high craving for ETOH since she saw her X-boyfriend and went to the home to see him where he and pt lived together there for 13 years. Pt enciuraged and reminded that Vivitrol is option and this therapist will set up an Vivitrol consult for pt if she agrees. Pt has \"no\" SI      Sherry Silva, LCSW            "

## 2020-02-19 NOTE — PROGRESS NOTES
"CD IOP Group note  Observations:    Engaged in Activity / Process and Self -disclosed: Yes  Applies Topic to self: Yes  Able to give Constructive Feedback: Yes  Affect: anxious  Degree of Insightful Thinking 5  Notes:  0671-1156  Pt processed anxiety. Assisted pt in making list of her primary triggers to use ETOH and a coping skill for each trigger listed. Pt has \"NO\" SI. Pt brought her daughter to family program today. Referred her daughter to [facility].       Sherry Silva LCSW            "

## 2020-02-19 NOTE — PROGRESS NOTES
Teaching Record:  Information / activity:  Stages of Family Recovery - Family Program    Good participation   1814-0726      Sherry Silva, LCSW

## 2020-02-20 ENCOUNTER — OFFICE VISIT (OUTPATIENT)
Dept: PSYCHIATRY | Facility: HOSPITAL | Age: 60
End: 2020-02-20

## 2020-02-20 DIAGNOSIS — F33.0 MILD EPISODE OF RECURRENT MAJOR DEPRESSIVE DISORDER (HCC): ICD-10-CM

## 2020-02-20 DIAGNOSIS — IMO0001 ALCOHOLISM /ALCOHOL ABUSE: Primary | ICD-10-CM

## 2020-02-20 NOTE — PROGRESS NOTES
"Teaching Record:  Information / activity:  \"This Emotional Life\" video - Anxiety, PTSD, and Depression    Listened quietly      MEENAKSHI LangleyW  "

## 2020-02-20 NOTE — PROGRESS NOTES
"CD IOP Group note  Observations:    Engaged in Activity / Process and Self -disclosed: Yes  Applies Topic to self: Yes  Able to give Constructive Feedback: Yes  Affect: anxious  Degree of Insightful Thinking 6  Notes:  115-1200  Pt participated in closure activities with group.   Assisted pt in formulation of and processing her weekend sobriety plan. Pt has \"no\" SI.      Sherry Silva, LCSW            "

## 2020-02-21 NOTE — PROGRESS NOTES
Discharge Summary    Maria Del Carmen attended  19 Sessions         Registration 1-20-20  Discharge Date  2/21/2020       Summary of Treatment and Progress towards goals:   Pt engaged in group quickly. Pt vacillated 3/4 of the way through KALYAN IOP feeling she was not an alcoholic. Pt processed high cravings for ETOH primary trigger bring anxiety and low self esteem. Pt moved out of home with 13 year relationship with man who 's chronic alcoholic and moved in with her sister. Pt processed shame and guilt and grief and loss of her relationship and her substance of abuse. Pt processed high cravings for ETOH. Pt referred for Vivitrol consult, however, pt declined injection.     Diagnostic Impression:   ETOH use disorder      Aftercare Plan:  Pt will attend [facility] for 6 months of group therapy 1X per week for 1.5 hours. Pt will continue to attend AA and remain abstinent.    Current Medications:  Current Outpatient Medications   Medication Sig Dispense Refill   • desvenlafaxine (PRISTIQ) 50 MG 24 hr tablet Take 1 tablet by mouth Daily. Indications: Major Depressive Disorder 30 tablet 0   • Multiple Vitamins-Minerals (MULTIVITAMIN ADULTS PO) Take 1 tablet by mouth Daily.     • traZODone (DESYREL) 50 MG tablet Take 50 mg by mouth Every Night.       No current facility-administered medications for this visit.        Referrals/ Appointments :   Paco WHITE,STEFFANIE Friday 2-22-20 @ 3pm          Sherry Silva LCSW

## 2020-05-28 ENCOUNTER — TELEPHONE (OUTPATIENT)
Dept: PSYCHIATRY | Facility: HOSPITAL | Age: 60
End: 2020-05-28

## 2023-12-11 NOTE — SIGNIFICANT NOTE
Education provided the Patient on the following:    - Nothing to Eat or Drink after MN the night before the procedure  -You will need to have someone drive you home after your Surgery and remain with you for 24 hours after the Procedure  - The date of your procedure, your are welcome to have one visitor at bedside or remain within 10-15 minutes of [facility]  -Please wear warm socks when you arrive for your Surgery  -Remove all jewelry and leave any valuables before arriving on the date of your procedure (all will have to be removed before leaving preop)  -You will need to arrive at 0900 on 12/12 Surgery  -Feel free to contact us at: [phone] with any additional questions/concerns

## 2023-12-12 ENCOUNTER — ANESTHESIA (OUTPATIENT)
Dept: SURGERY | Facility: SURGERY CENTER | Age: 63
End: 2023-12-12
Payer: COMMERCIAL

## 2023-12-12 ENCOUNTER — HOSPITAL ENCOUNTER (OUTPATIENT)
Facility: SURGERY CENTER | Age: 63
Setting detail: HOSPITAL OUTPATIENT SURGERY
Discharge: HOME | End: 2023-12-12
Attending: PODIATRIST | Admitting: PODIATRIST
Payer: COMMERCIAL

## 2023-12-12 ENCOUNTER — ANESTHESIA EVENT (OUTPATIENT)
Dept: SURGERY | Facility: SURGERY CENTER | Age: 63
End: 2023-12-12
Payer: COMMERCIAL

## 2023-12-12 VITALS
RESPIRATION RATE: 16 BRPM | OXYGEN SATURATION: 100 % | SYSTOLIC BLOOD PRESSURE: 133 MMHG | DIASTOLIC BLOOD PRESSURE: 89 MMHG | HEIGHT: 70 IN | WEIGHT: 151 LBS | BODY MASS INDEX: 21.62 KG/M2 | HEART RATE: 63 BPM | TEMPERATURE: 97.3 F

## 2023-12-12 PROCEDURE — 25010000002 FENTANYL CITRATE (PF) 50 MCG/ML SOLUTION: Performed by: ANESTHESIOLOGY

## 2023-12-12 PROCEDURE — 25010000002 MIDAZOLAM PER 1 MG: Performed by: ANESTHESIOLOGY

## 2023-12-12 PROCEDURE — 25010000002 BUPIVACAINE 0.5 % SOLUTION: Performed by: PODIATRIST

## 2023-12-12 PROCEDURE — 28110 PART REMOVAL OF METATARSAL: CPT | Performed by: PODIATRIST

## 2023-12-12 PROCEDURE — 25010000002 PROPOFOL 1000 MG/100ML EMULSION: Performed by: ANESTHESIOLOGY

## 2023-12-12 PROCEDURE — 25010000002 CEFAZOLIN SODIUM-DEXTROSE 1-4 GM-%(50ML) RECONSTITUTED SOLUTION: Performed by: ANESTHESIOLOGY

## 2023-12-12 PROCEDURE — 25010000002 LIDOCAINE 1 % SOLUTION: Performed by: PODIATRIST

## 2023-12-12 PROCEDURE — 25010000002 PROPOFOL 10 MG/ML EMULSION: Performed by: ANESTHESIOLOGY

## 2023-12-12 PROCEDURE — 25010000002 DROPERIDOL PER 5 MG: Performed by: ANESTHESIOLOGY

## 2023-12-12 PROCEDURE — 25010000002 DEXAMETHASONE SODIUM PHOSPHATE 20 MG/5ML SOLUTION: Performed by: ANESTHESIOLOGY

## 2023-12-12 PROCEDURE — 25810000003 LACTATED RINGERS PER 1000 ML: Performed by: PODIATRIST

## 2023-12-12 RX ORDER — DROPERIDOL 2.5 MG/ML
0.62 INJECTION, SOLUTION INTRAMUSCULAR; INTRAVENOUS
Status: DISCONTINUED | OUTPATIENT
Start: 2023-12-12 | End: 2023-12-12 | Stop reason: HOSPADM

## 2023-12-12 RX ORDER — LAMOTRIGINE 100 MG/1
TABLET ORAL
COMMUNITY
Start: 2023-09-17

## 2023-12-12 RX ORDER — NALOXONE HCL 0.4 MG/ML
0.2 VIAL (ML) INJECTION AS NEEDED
Status: DISCONTINUED | OUTPATIENT
Start: 2023-12-12 | End: 2023-12-12 | Stop reason: HOSPADM

## 2023-12-12 RX ORDER — MIDAZOLAM HYDROCHLORIDE 1 MG/ML
1 INJECTION INTRAMUSCULAR; INTRAVENOUS
Status: DISCONTINUED | OUTPATIENT
Start: 2023-12-12 | End: 2023-12-12 | Stop reason: HOSPADM

## 2023-12-12 RX ORDER — LIDOCAINE HYDROCHLORIDE 20 MG/ML
INJECTION, SOLUTION INFILTRATION; PERINEURAL AS NEEDED
Status: DISCONTINUED | OUTPATIENT
Start: 2023-12-12 | End: 2023-12-12 | Stop reason: SURG

## 2023-12-12 RX ORDER — FENTANYL CITRATE 50 UG/ML
INJECTION, SOLUTION INTRAMUSCULAR; INTRAVENOUS AS NEEDED
Status: DISCONTINUED | OUTPATIENT
Start: 2023-12-12 | End: 2023-12-12 | Stop reason: SURG

## 2023-12-12 RX ORDER — PROPOFOL 10 MG/ML
INJECTION, EMULSION INTRAVENOUS AS NEEDED
Status: DISCONTINUED | OUTPATIENT
Start: 2023-12-12 | End: 2023-12-12 | Stop reason: SURG

## 2023-12-12 RX ORDER — BUPIVACAINE HYDROCHLORIDE 5 MG/ML
INJECTION, SOLUTION PERINEURAL AS NEEDED
Status: DISCONTINUED | OUTPATIENT
Start: 2023-12-12 | End: 2023-12-12 | Stop reason: HOSPADM

## 2023-12-12 RX ORDER — FAMOTIDINE 10 MG/ML
20 INJECTION, SOLUTION INTRAVENOUS ONCE
Status: COMPLETED | OUTPATIENT
Start: 2023-12-12 | End: 2023-12-12

## 2023-12-12 RX ORDER — SODIUM CHLORIDE, SODIUM LACTATE, POTASSIUM CHLORIDE, CALCIUM CHLORIDE 600; 310; 30; 20 MG/100ML; MG/100ML; MG/100ML; MG/100ML
9 INJECTION, SOLUTION INTRAVENOUS CONTINUOUS
Status: DISCONTINUED | OUTPATIENT
Start: 2023-12-12 | End: 2023-12-12 | Stop reason: HOSPADM

## 2023-12-12 RX ORDER — SODIUM CHLORIDE 0.9 % (FLUSH) 0.9 %
10 SYRINGE (ML) INJECTION AS NEEDED
Status: DISCONTINUED | OUTPATIENT
Start: 2023-12-12 | End: 2023-12-12 | Stop reason: HOSPADM

## 2023-12-12 RX ORDER — DIPHENHYDRAMINE HYDROCHLORIDE 50 MG/ML
12.5 INJECTION INTRAMUSCULAR; INTRAVENOUS
Status: DISCONTINUED | OUTPATIENT
Start: 2023-12-12 | End: 2023-12-12 | Stop reason: HOSPADM

## 2023-12-12 RX ORDER — ONDANSETRON 2 MG/ML
4 INJECTION INTRAMUSCULAR; INTRAVENOUS ONCE AS NEEDED
Status: DISCONTINUED | OUTPATIENT
Start: 2023-12-12 | End: 2023-12-12 | Stop reason: HOSPADM

## 2023-12-12 RX ORDER — PROMETHAZINE HYDROCHLORIDE 12.5 MG/1
25 TABLET ORAL ONCE AS NEEDED
Status: DISCONTINUED | OUTPATIENT
Start: 2023-12-12 | End: 2023-12-12 | Stop reason: HOSPADM

## 2023-12-12 RX ORDER — PROMETHAZINE HYDROCHLORIDE 25 MG/1
25 SUPPOSITORY RECTAL ONCE AS NEEDED
Status: DISCONTINUED | OUTPATIENT
Start: 2023-12-12 | End: 2023-12-12 | Stop reason: HOSPADM

## 2023-12-12 RX ORDER — IBUPROFEN 200 MG
1 CAPSULE ORAL
COMMUNITY

## 2023-12-12 RX ORDER — LIDOCAINE HYDROCHLORIDE 10 MG/ML
0.5 INJECTION, SOLUTION INFILTRATION; PERINEURAL ONCE AS NEEDED
Status: DISCONTINUED | OUTPATIENT
Start: 2023-12-12 | End: 2023-12-12 | Stop reason: HOSPADM

## 2023-12-12 RX ORDER — LIDOCAINE HYDROCHLORIDE 10 MG/ML
INJECTION, SOLUTION INFILTRATION; PERINEURAL AS NEEDED
Status: DISCONTINUED | OUTPATIENT
Start: 2023-12-12 | End: 2023-12-12 | Stop reason: HOSPADM

## 2023-12-12 RX ORDER — DEXAMETHASONE SODIUM PHOSPHATE 4 MG/ML
INJECTION, SOLUTION INTRA-ARTICULAR; INTRALESIONAL; INTRAMUSCULAR; INTRAVENOUS; SOFT TISSUE AS NEEDED
Status: DISCONTINUED | OUTPATIENT
Start: 2023-12-12 | End: 2023-12-12 | Stop reason: SURG

## 2023-12-12 RX ORDER — FENTANYL CITRATE 50 UG/ML
25 INJECTION, SOLUTION INTRAMUSCULAR; INTRAVENOUS
Status: DISCONTINUED | OUTPATIENT
Start: 2023-12-12 | End: 2023-12-12 | Stop reason: HOSPADM

## 2023-12-12 RX ORDER — FENTANYL CITRATE 50 UG/ML
50 INJECTION, SOLUTION INTRAMUSCULAR; INTRAVENOUS ONCE AS NEEDED
Status: DISCONTINUED | OUTPATIENT
Start: 2023-12-12 | End: 2023-12-12 | Stop reason: HOSPADM

## 2023-12-12 RX ORDER — FLUMAZENIL 0.1 MG/ML
0.2 INJECTION INTRAVENOUS AS NEEDED
Status: DISCONTINUED | OUTPATIENT
Start: 2023-12-12 | End: 2023-12-12 | Stop reason: HOSPADM

## 2023-12-12 RX ORDER — MELATONIN: COMMUNITY

## 2023-12-12 RX ORDER — HYDROXYZINE HYDROCHLORIDE 25 MG/1
TABLET, FILM COATED ORAL
COMMUNITY
Start: 2023-09-13

## 2023-12-12 RX ORDER — SODIUM CHLORIDE, SODIUM LACTATE, POTASSIUM CHLORIDE, CALCIUM CHLORIDE 600; 310; 30; 20 MG/100ML; MG/100ML; MG/100ML; MG/100ML
1000 INJECTION, SOLUTION INTRAVENOUS CONTINUOUS
Status: DISCONTINUED | OUTPATIENT
Start: 2023-12-12 | End: 2023-12-12 | Stop reason: HOSPADM

## 2023-12-12 RX ORDER — SODIUM CHLORIDE 0.9 % (FLUSH) 0.9 %
3-10 SYRINGE (ML) INJECTION AS NEEDED
Status: DISCONTINUED | OUTPATIENT
Start: 2023-12-12 | End: 2023-12-12 | Stop reason: HOSPADM

## 2023-12-12 RX ORDER — SODIUM CHLORIDE 0.9 % (FLUSH) 0.9 %
3 SYRINGE (ML) INJECTION EVERY 12 HOURS SCHEDULED
Status: DISCONTINUED | OUTPATIENT
Start: 2023-12-12 | End: 2023-12-12 | Stop reason: HOSPADM

## 2023-12-12 RX ORDER — CEFAZOLIN SODIUM 1 G/50ML
SOLUTION INTRAVENOUS AS NEEDED
Status: DISCONTINUED | OUTPATIENT
Start: 2023-12-12 | End: 2023-12-12 | Stop reason: SURG

## 2023-12-12 RX ADMIN — SODIUM CHLORIDE, POTASSIUM CHLORIDE, SODIUM LACTATE AND CALCIUM CHLORIDE 1000 ML: 600; 310; 30; 20 INJECTION, SOLUTION INTRAVENOUS at 10:20

## 2023-12-12 RX ADMIN — LIDOCAINE HYDROCHLORIDE 40 MG: 20 INJECTION, SOLUTION INFILTRATION; PERINEURAL at 10:52

## 2023-12-12 RX ADMIN — MIDAZOLAM 1 MG: 1 INJECTION INTRAMUSCULAR; INTRAVENOUS at 10:41

## 2023-12-12 RX ADMIN — FENTANYL CITRATE 25 MCG: 0.05 INJECTION, SOLUTION INTRAMUSCULAR; INTRAVENOUS at 10:50

## 2023-12-12 RX ADMIN — PROPOFOL 100 MG: 10 INJECTION, EMULSION INTRAVENOUS at 10:52

## 2023-12-12 RX ADMIN — FENTANYL CITRATE 25 MCG: 0.05 INJECTION, SOLUTION INTRAMUSCULAR; INTRAVENOUS at 10:53

## 2023-12-12 RX ADMIN — CEFAZOLIN SODIUM 1 G: 1 SOLUTION INTRAVENOUS at 10:48

## 2023-12-12 RX ADMIN — PROPOFOL 200 MCG/KG/MIN: 10 INJECTION, EMULSION INTRAVENOUS at 10:52

## 2023-12-12 RX ADMIN — DROPERIDOL 0.62 MG: 2.5 INJECTION, SOLUTION INTRAMUSCULAR; INTRAVENOUS at 10:50

## 2023-12-12 RX ADMIN — DEXAMETHASONE SODIUM PHOSPHATE 4 MG: 4 INJECTION, SOLUTION INTRAMUSCULAR; INTRAVENOUS at 10:54

## 2023-12-12 RX ADMIN — FAMOTIDINE 20 MG: 10 INJECTION, SOLUTION INTRAVENOUS at 10:41

## 2023-12-12 NOTE — ANESTHESIA PREPROCEDURE EVALUATION
Anesthesia Evaluation     Patient summary reviewed   no history of anesthetic complications:   NPO Solid Status: > 8 hours  NPO Liquid Status: > 2 hours           Airway   Mallampati: II  TM distance: >3 FB  Neck ROM: full  No difficulty expected  Dental - normal exam     Pulmonary     breath sounds clear to auscultation  (-) shortness of breath, recent URI, not a smoker  Cardiovascular   Exercise tolerance: good (4-7 METS)    Rhythm: regular  Rate: normal    (+) hypertension  (-) past MI, dysrhythmias, angina      Neuro/Psych  (+) seizures (reports last >6mos ago), CVA (per imaging, pt unaware), psychiatric history (tx'd) Anxiety and Depression  GI/Hepatic/Renal/Endo    (-) no renal disease, diabetes    Musculoskeletal     (-) neck stiffness  Abdominal    Substance History   (+) alcohol use (h/o abuse, current daily use)     OB/GYN          Other                          Anesthesia Plan    ASA 3     MAC     (MAC anesthesia discussed with patient and/or patient representative. Risks (including but not limited to intra-op awareness), benefits, and alternatives were discussed. Understanding was voiced with an agreement to proceed with a MAC technique and General as a backup option. )    Anesthetic plan, risks, benefits, and alternatives have been provided, discussed and informed consent has been obtained with: patient.        CODE STATUS:

## 2023-12-12 NOTE — OP NOTE
[facility]  Procedure Report    Patient Name:  Maria Del Carmen Ricks  YOB: 1960    Date of Surgery:  12/12/2023     Indications:  Pain with ambulation    Pre-op Diagnosis:   Acquired hammer toe of right foot [M20.41]  Hammer toe of left foot [M20.42]       Post-Op Diagnosis Codes:     * Acquired hammer toe of right foot [M20.41]     * Hammer toe of left foot [M20.42]    Procedure/CPT® Codes:      Procedure(s):  OSTECTOMY 5TH DIGIT B/L FOOT    Staff:  Surgeon(s):  Dimitry Ibarra DPM         Anesthesia: MAC with Local    Estimated Blood Loss: Minimal    Implants:    Nothing was implanted during the procedure    Specimen:          None        Findings: See Op report    Complications: None    Description of Procedure: The patient was brought into the operating room and placed on the operating room table in a supine position the patient was then placed under monitored anesthesia. A local block consisting of 20cc of a 1 to 1 mixture of 1% lidocaine plain and 0.5% Marcaine plain was then locally infiltrated.   The foot was then scrubbed prepped and draped in the usual aseptic manner. The foot was exsanguinated and the pneumatic ankle tourniquet was then inflated. Our attention was then directed to the right and left foot where a 0.5 m incision was created over the HPK. The incision was then deepened through the subcutaneous tissue all vital neural and vascular structures were retracted, all bleeders were cauterized and ligated as necessary. The soft tissue was reflected and the bone spurring was exposed.  It was then removed using a bone ronguer on both digits  The area was then flushed with copious amounts of normal Saline solution. The capsule was then repaired with 3-0 Vicryl the skin was repaired with 5-0 proline. The area was then dressed with a dry sterile dressing. The pneumatic ankle tourniquet was released and a promptVascular response was noted in the digits. Patient will be allowed to  partial weight bear with a postoperative shoe and follow up with myself in the office in one week          Dimitry Ibarra DPM     Date: 12/12/2023  Time: 10:16 EST

## 2023-12-12 NOTE — H&P
Patient Care Team:  Lilibeth Mukherjee APRN as PCP - General (Nurse Practitioner)    Chief complaint painful 5th digit b/l    Subjective     Patient is a 63 y.o. female presents with painful 5th digit b/.   Review of Systems   Review of Systems   Constitutional: Negative.    HENT: Negative.     Eyes: Negative.    Respiratory: Negative.     Cardiovascular: Negative.    Gastrointestinal: Negative.    Endocrine: Negative.    Genitourinary: Negative.    Musculoskeletal: Negative.    Skin: Negative.    Allergic/Immunologic: Negative.    Neurological: Negative.    Hematological: Negative.    Psychiatric/Behavioral: Negative.         History  Past Medical History:   Diagnosis Date    Alcohol abuse     Anxiety     Depression     Hypertension     Seizures      Past Surgical History:   Procedure Laterality Date    SHOULDER ROTATOR CUFF REPAIR Right 02/01/2017     History reviewed. No pertinent family history.  Social History     Tobacco Use    Smoking status: Never    Smokeless tobacco: Never   Substance Use Topics    Alcohol use: Yes     Alcohol/week: 3.0 - 4.0 standard drinks of alcohol     Types: 3 - 4 Cans of beer per week     Comment: Daily with more on weekends    Drug use: Never     E-cigarette/Vaping     E-cigarette/Vaping Substances     Medications Prior to Admission   Medication Sig Dispense Refill Last Dose    LISINOPRIL PO Take 5 mg by mouth Daily.   12/11/2023    Multiple Vitamins-Minerals (MULTIVITAMIN ADULTS PO) Take 1 tablet by mouth Daily. Indications: General health   12/11/2023    sertraline (ZOLOFT) 50 MG tablet Take 1 tablet by mouth Daily.   12/12/2023    traZODone (DESYREL) 50 MG tablet Take 1 tablet by mouth Every Night. Indications: Trouble Sleeping   12/11/2023     Allergies:  Patient has no known allergies.    Objective     Vital Signs       Physical Exam:    Physical Exam  HENT:      Head: Normocephalic and atraumatic.   Cardiovascular:      Rate and Rhythm: Normal rate and regular rhythm.    Pulmonary:      Effort: Pulmonary effort is normal.      Breath sounds: Normal breath sounds.   Musculoskeletal:         General: Normal range of motion.      Cervical back: Normal range of motion and neck supple.   Feet:      Comments: + HPK 5th with uderlying spurring b/l  Skin:     General: Skin is warm and dry.      Capillary Refill: Capillary refill takes less than 2 seconds.   Neurological:      General: No focal deficit present.      Mental Status: She is alert and oriented to person, place, and time.         Results Review:   I reviewed the patient's new clinical results.  I reviewed the patient's new imaging results and agree with the interpretation.  I reviewed the patient's other test results and agree with the interpretation    Assessment & Plan       * No active hospital problems. *      **    I discussed the patients findings and my recommendations with patient.     Dimitry Ibarra DPM  12/12/23  10:15 EST

## 2023-12-13 NOTE — ANESTHESIA POSTPROCEDURE EVALUATION
"Patient: Maria Del Carmen Ricks    Procedure Summary       Date: 12/12/23 Room / Location: [facility]    Anesthesia Start: 1046 Anesthesia Stop: 1123    Procedure: OSTECTOMY 5TH DIGIT B/L FOOT (Bilateral: Foot) Diagnosis:       Acquired hammer toe of right foot      Hammer toe of left foot      (Acquired hammer toe of right foot [M20.41])      (Hammer toe of left foot [M20.42])    Surgeons: Dimitry Ibarra DPM Provider: Richard Madrigal DO    Anesthesia Type: MAC ASA Status: 3            Anesthesia Type: MAC    Vitals  Vitals Value Taken Time   /76 12/12/23 1145   Temp 36.3 °C (97.3 °F) 12/12/23 1135   Pulse 68 12/12/23 1145   Resp 16 12/12/23 1145   SpO2 98 % 12/12/23 1145           Post Anesthesia Care and Evaluation    Patient location during evaluation: bedside  Patient participation: complete - patient participated  Level of consciousness: awake and alert  Pain management: adequate    Airway patency: patent  Anesthetic complications: No anesthetic complications  PONV Status: controlled  Cardiovascular status: acceptable and hemodynamically stable  Respiratory status: acceptable, spontaneous ventilation and nonlabored ventilation  Hydration status: acceptable    Comments: /89   Pulse 63   Temp 36.3 °C (97.3 °F)   Resp 16   Ht 177.8 cm (70\")   Wt 68.5 kg (151 lb)   SpO2 100%   BMI 21.67 kg/m²           "

## (undated) DEVICE — SYR LL TP 10ML STRL

## (undated) DEVICE — BNDG ESMARK 4IN 12FT LF STRL BLU

## (undated) DEVICE — DRAPE,EXTREMITY,89X128,STERILE: Brand: MEDLINE

## (undated) DEVICE — TBG PENCL TELESCP MEGADYNE SMOKE EVAC 10FT

## (undated) DEVICE — PK MINOR PROCEDURE 46

## (undated) DEVICE — SPNG GZ WOVN 4X4IN 12PLY 10/BX STRL

## (undated) DEVICE — NDL HYPO PRECISIONGLIDE REG 25G 1 1/2

## (undated) DEVICE — Device

## (undated) DEVICE — SUT VIC 3/0 SH 27IN J416H

## (undated) DEVICE — BANDAGE ROLL,100% COTTON, 6 PLY, LARGE: Brand: KERLIX

## (undated) DEVICE — INTENDED FOR TISSUE SEPARATION, AND OTHER PROCEDURES THAT REQUIRE A SHARP SURGICAL BLADE TO PUNCTURE OR CUT.: Brand: BARD-PARKER ® CARBON RIB-BACK BLADES

## (undated) DEVICE — APPL CHLORAPREP HI/LITE 26ML ORNG

## (undated) DEVICE — BANDAGE,GAUZE,CONFORMING,1"X75",STRL,LF: Brand: MEDLINE

## (undated) DEVICE — DRAPE,REIN 53X77,STERILE: Brand: MEDLINE

## (undated) DEVICE — GRD PIN WHT 0.45

## (undated) DEVICE — DRSNG WND GZ CURAD OIL EMULSION 3X3IN STRL

## (undated) DEVICE — SUT PROLN 5/0 PC3 BL 18IN 8635G

## (undated) DEVICE — NDL HYPO PRECISIONGLIDE REG 19G 1 1/2

## (undated) DEVICE — DISPOSABLE TOURNIQUET CUFF SINGLE BLADDER, SINGLE PORT AND QUICK CONNECT CONNECTOR: Brand: COLOR CUFF

## (undated) DEVICE — BNDG GZ SOF-FORM CONFRM 3X75IN LF STRL

## (undated) DEVICE — IRRIGATOR BULB ASEPTO 60CC STRL

## (undated) DEVICE — 9165 UNIVERSAL PATIENT PLATE: Brand: 3M™

## (undated) DEVICE — GLV SURG BIOGEL LTX PF 7 1/2